# Patient Record
Sex: FEMALE | Race: WHITE | Employment: OTHER | URBAN - METROPOLITAN AREA
[De-identification: names, ages, dates, MRNs, and addresses within clinical notes are randomized per-mention and may not be internally consistent; named-entity substitution may affect disease eponyms.]

---

## 2017-02-28 ENCOUNTER — TELEPHONE (OUTPATIENT)
Dept: FAMILY MEDICINE CLINIC | Facility: CLINIC | Age: 72
End: 2017-02-28

## 2017-02-28 DIAGNOSIS — M85.852 OSTEOPENIA OF THIGH, BILATERAL: Primary | ICD-10-CM

## 2017-02-28 DIAGNOSIS — M85.851 OSTEOPENIA OF THIGH, BILATERAL: Primary | ICD-10-CM

## 2017-04-03 ENCOUNTER — HOSPITAL ENCOUNTER (OUTPATIENT)
Dept: BONE DENSITY | Age: 72
Discharge: HOME | End: 2017-04-03
Attending: FAMILY MEDICINE
Payer: MEDICARE

## 2017-04-03 DIAGNOSIS — M85.852 OSTEOPENIA OF THIGH, BILATERAL: ICD-10-CM

## 2017-04-03 DIAGNOSIS — M85.851 OSTEOPENIA OF THIGH, BILATERAL: ICD-10-CM

## 2017-04-03 PROCEDURE — 77080 DXA BONE DENSITY AXIAL: CPT

## 2017-04-04 ENCOUNTER — TELEPHONE (OUTPATIENT)
Dept: FAMILY MEDICINE CLINIC | Facility: CLINIC | Age: 72
End: 2017-04-04

## 2017-04-11 ENCOUNTER — OFFICE VISIT (OUTPATIENT)
Dept: FAMILY MEDICINE CLINIC | Facility: CLINIC | Age: 72
End: 2017-04-11

## 2017-04-11 VITALS
TEMPERATURE: 98 F | DIASTOLIC BLOOD PRESSURE: 62 MMHG | HEART RATE: 65 BPM | SYSTOLIC BLOOD PRESSURE: 116 MMHG | RESPIRATION RATE: 16 BRPM | HEIGHT: 61 IN | WEIGHT: 134 LBS | BODY MASS INDEX: 25.3 KG/M2 | OXYGEN SATURATION: 99 %

## 2017-04-11 DIAGNOSIS — I10 BENIGN ESSENTIAL HYPERTENSION: ICD-10-CM

## 2017-04-11 DIAGNOSIS — Z00.00 ROUTINE GENERAL MEDICAL EXAMINATION AT A HEALTH CARE FACILITY: Primary | ICD-10-CM

## 2017-04-11 DIAGNOSIS — K63.5 POLYP OF COLON, UNSPECIFIED PART OF COLON, UNSPECIFIED TYPE: ICD-10-CM

## 2017-04-11 PROCEDURE — G0439 PPPS, SUBSEQ VISIT: HCPCS | Performed by: FAMILY MEDICINE

## 2017-04-11 PROCEDURE — 99214 OFFICE O/P EST MOD 30 MIN: CPT | Performed by: FAMILY MEDICINE

## 2017-04-11 NOTE — PATIENT INSTRUCTIONS
Good exam today     Ok to take CoQ10 and omega 3 supplements. Referral to dr. Silvia Oscar re: colonoscopy       Recheck in 1 year.

## 2017-04-12 NOTE — PROGRESS NOTES
[place] [facility]  PROGRESS [facility]  Chief Complaint:   Patient presents with:  Physical      HPI:   This is a 66year old female coming in for general health checkup and follow-up of hypertension hypercholesterolemia.   I reviewed with her her rou vision, double vision . Ears, Nose, Throat:  Denies hearing loss,or disturbance. Denies sore throat  INTEGUMENTARY:  Denies rashes, itching.   CARDIOVASCULAR:   SEE HPI  Denies chest pain, chest pressure, chest discomfort, palpitations, edema, dyspnea on rales/rhonchi/wheezing. ABDOMEN:  Soft, nondistended, nontender, bowel sounds normal in all 4 quadrants, no masses, no hepatosplenomegaly. BACK: No tenderness, no spasm, SLR test negative, FROM.   EXTREMITIES:  No edema, no cyanosis, no clubbing, FROM, MD  4/12/2017  5:31 PM

## 2017-05-08 ENCOUNTER — OFFICE VISIT (OUTPATIENT)
Dept: FAMILY MEDICINE CLINIC | Facility: CLINIC | Age: 72
End: 2017-05-08

## 2017-05-08 VITALS
TEMPERATURE: 98 F | SYSTOLIC BLOOD PRESSURE: 124 MMHG | BODY MASS INDEX: 24.92 KG/M2 | OXYGEN SATURATION: 98 % | HEIGHT: 61 IN | DIASTOLIC BLOOD PRESSURE: 74 MMHG | RESPIRATION RATE: 16 BRPM | WEIGHT: 132 LBS | HEART RATE: 76 BPM

## 2017-05-08 DIAGNOSIS — S46.912A MUSCLE STRAIN, UPPER ARM, LEFT, INITIAL ENCOUNTER: Primary | ICD-10-CM

## 2017-05-08 PROCEDURE — 99213 OFFICE O/P EST LOW 20 MIN: CPT | Performed by: FAMILY MEDICINE

## 2017-05-08 NOTE — PROGRESS NOTES
Chief Complaint:   Patient presents with:  Arm Pain: left arm pain for approx. 4 months that comes and goes      HPI:   This is a 66year old female coming in for arm pain on the left side that is been present for a box 2-3 weeks.   Patient states that this chills, or fatigue,  EENT:  Eyes:  Denies eye pain, visual changes,   Ears, Nose, Throat:  Denies hearing loss,or disturbance. Denies sore throat  INTEGUMENTARY:  Denies rashes, itching.   CARDIOVASCULAR: Denies  chest discomfort, palpitations, edema,  RESP normal     No swelling, no erythema   No edema, no cyanosis,FROM,    NEURO:  No deficit, normal gait, strength and tone, sensory intact,   PSYCH: no depression or anxiety noted. ASSESSMENT AND PLAN:   1.  Muscle strain, upper arm, left, initial encounter

## 2017-05-21 RX ORDER — AMLODIPINE BESYLATE 2.5 MG/1
TABLET ORAL
Qty: 180 TABLET | Refills: 3 | Status: SHIPPED | OUTPATIENT
Start: 2017-05-21 | End: 2018-04-13

## 2017-05-21 RX ORDER — SIMVASTATIN 10 MG
TABLET ORAL
Qty: 90 TABLET | Refills: 3 | Status: SHIPPED | OUTPATIENT
Start: 2017-05-21 | End: 2018-04-13

## 2017-10-27 ENCOUNTER — MED REC SCAN ONLY (OUTPATIENT)
Dept: FAMILY MEDICINE CLINIC | Facility: CLINIC | Age: 72
End: 2017-10-27

## 2017-11-30 ENCOUNTER — TELEPHONE (OUTPATIENT)
Dept: FAMILY MEDICINE CLINIC | Facility: CLINIC | Age: 72
End: 2017-11-30

## 2017-11-30 DIAGNOSIS — Z23 NEED FOR VACCINATION: ICD-10-CM

## 2017-11-30 DIAGNOSIS — Z00.00 ENCOUNTER FOR ANNUAL HEALTH EXAMINATION: ICD-10-CM

## 2017-11-30 NOTE — TELEPHONE ENCOUNTER
----- Message from Lynnette Luna sent at 11/30/2017 12:09 PM CST -----  Regarding: Visit Follow-up Question  Contact: [phone]  Dr. Dimitry Rosado,    When I visited last, you and I discussed whether I should get the pneumonia vaccine and influenza vacc

## 2017-12-04 NOTE — PATIENT INSTRUCTIONS
Denisse Villalobos's SCREENING SCHEDULE   Tests on this list are recommended by your physician but may not be covered, or covered at this frequency, by your insurer. Please check with your insurance carrier before scheduling to verify coverage.    PREVENT results found for this or any previous visit. No flowsheet data found. Fecal Occult Blood   Covered Annually No results found for: FOB, OCCULTSTOOL No flowsheet data found.      Barium Enema-   uncomfortable but covered  Covered but uncomfortable   Glau Hepatitis B for Moderate/High Risk       No orders found for this or any previous visit.  Medium/high risk factors:   End-stage renal disease   Hemophiliacs who received Factor VIII or IX concentrates   Clients of institutions for the mentally retarded   Pe

## 2017-12-04 NOTE — TELEPHONE ENCOUNTER
Only certain forms of flu vaccine and yellow fever vaccines are made with eggs. Prevnar and pneumovax are not made with eggs. It should be safe for patient to receive pneumonia vaccination.      Recommend Prevnar vaccination in the future

## 2017-12-08 ENCOUNTER — NURSE ONLY (OUTPATIENT)
Dept: FAMILY MEDICINE CLINIC | Facility: CLINIC | Age: 72
End: 2017-12-08

## 2017-12-08 DIAGNOSIS — Z23 NEED FOR VACCINATION: ICD-10-CM

## 2017-12-08 PROCEDURE — G0009 ADMIN PNEUMOCOCCAL VACCINE: HCPCS | Performed by: FAMILY MEDICINE

## 2017-12-08 PROCEDURE — 90670 PCV13 VACCINE IM: CPT | Performed by: FAMILY MEDICINE

## 2017-12-08 NOTE — PROGRESS NOTES
Patient given Prevnar 13 vaccination  Order in chart as per Dr. Eryn Jonas given to patient  Patient tolerated injection well    Donnell Martines, 12/08/17, 9:13 AM

## 2018-04-13 ENCOUNTER — OFFICE VISIT (OUTPATIENT)
Dept: FAMILY MEDICINE CLINIC | Facility: CLINIC | Age: 73
End: 2018-04-13

## 2018-04-13 ENCOUNTER — APPOINTMENT (OUTPATIENT)
Dept: LAB | Age: 73
End: 2018-04-13
Attending: FAMILY MEDICINE
Payer: MEDICARE

## 2018-04-13 VITALS
TEMPERATURE: 98 F | DIASTOLIC BLOOD PRESSURE: 62 MMHG | BODY MASS INDEX: 25.61 KG/M2 | HEART RATE: 72 BPM | SYSTOLIC BLOOD PRESSURE: 112 MMHG | WEIGHT: 135.63 LBS | RESPIRATION RATE: 16 BRPM | HEIGHT: 61 IN

## 2018-04-13 DIAGNOSIS — Z00.00 HEALTH CARE MAINTENANCE: Primary | ICD-10-CM

## 2018-04-13 DIAGNOSIS — Z00.00 ENCOUNTER FOR ANNUAL HEALTH EXAMINATION: ICD-10-CM

## 2018-04-13 DIAGNOSIS — M85.851 OSTEOPENIA OF THIGH, BILATERAL: ICD-10-CM

## 2018-04-13 DIAGNOSIS — I10 BENIGN ESSENTIAL HYPERTENSION: ICD-10-CM

## 2018-04-13 DIAGNOSIS — E78.00 PURE HYPERCHOLESTEROLEMIA: ICD-10-CM

## 2018-04-13 DIAGNOSIS — M85.852 OSTEOPENIA OF THIGH, BILATERAL: ICD-10-CM

## 2018-04-13 PROCEDURE — 80053 COMPREHEN METABOLIC PANEL: CPT | Performed by: FAMILY MEDICINE

## 2018-04-13 PROCEDURE — G0439 PPPS, SUBSEQ VISIT: HCPCS | Performed by: FAMILY MEDICINE

## 2018-04-13 PROCEDURE — 36415 COLL VENOUS BLD VENIPUNCTURE: CPT | Performed by: FAMILY MEDICINE

## 2018-04-13 PROCEDURE — 84443 ASSAY THYROID STIM HORMONE: CPT | Performed by: FAMILY MEDICINE

## 2018-04-13 PROCEDURE — 82306 VITAMIN D 25 HYDROXY: CPT | Performed by: FAMILY MEDICINE

## 2018-04-13 PROCEDURE — 80061 LIPID PANEL: CPT | Performed by: FAMILY MEDICINE

## 2018-04-13 PROCEDURE — 85025 COMPLETE CBC W/AUTO DIFF WBC: CPT | Performed by: FAMILY MEDICINE

## 2018-04-13 RX ORDER — AMLODIPINE BESYLATE 2.5 MG/1
2.5 TABLET ORAL 2 TIMES DAILY
Qty: 180 TABLET | Refills: 3 | Status: SHIPPED | OUTPATIENT
Start: 2018-04-13 | End: 2019-05-29

## 2018-04-13 RX ORDER — SIMVASTATIN 10 MG
10 TABLET ORAL
Qty: 90 TABLET | Refills: 3 | Status: SHIPPED | OUTPATIENT
Start: 2018-04-13 | End: 2019-05-29

## 2018-04-13 RX ORDER — CHLORAL HYDRATE 500 MG
1 CAPSULE ORAL DAILY
COMMUNITY

## 2018-04-13 RX ORDER — ERGOCALCIFEROL (VITAMIN D2) 10 MCG
800 TABLET ORAL 2 TIMES DAILY
COMMUNITY
End: 2019-11-22 | Stop reason: ALTCHOICE

## 2018-04-13 NOTE — PROGRESS NOTES
Chief Complaint:   Patient presents with:  Physical: Medicare annual      HPI:   This is a 67year old female coming in for healthcare check. Patient is been overall feeling well. I reviewed her most recent laboratory testing.   Patient is taking her me reaction(s): rash  Latex                       Comment:Other reaction(s): rash  Lidocaine                   Comment:Other reaction(s): shaking in legs  Adhesive Tape           Rash    Comment:RASH/BLISTERS IMMEDIATELY  Beef                        Comment:O 25.62 kg/m² as calculated from the following:    Height as of this encounter: 61\". Weight as of this encounter: 135 lb 9.6 oz. Vital signs reviewed. Appears stated age, well groomed.     Physical Exam:    GEN:  Patient is alert, awake and oriented, wel Sig: Take 1 tablet (25 mg total) by mouth once daily. simvastatin 10 MG Oral Tab 90 tablet 3      Sig: Take 1 tablet (10 mg total) by mouth once daily.       AmLODIPine Besylate 2.5 MG Oral Tab 180 tablet 3      Sig: Take 1 tablet (2.5 mg total) by gloria

## 2018-04-14 ENCOUNTER — TELEPHONE (OUTPATIENT)
Dept: FAMILY MEDICINE CLINIC | Facility: CLINIC | Age: 73
End: 2018-04-14

## 2018-04-14 NOTE — TELEPHONE ENCOUNTER
Patient notified of test results and Dr. Janiya Rockwell. Armand's instructions. Patient states she has never seen Dr. Angeles Mitchell and has not seen a rheumatologist for several years. Asking if she should be followed by rheumatology?  If yes, do you recommend Dr. Lexx Hall

## 2018-04-14 NOTE — TELEPHONE ENCOUNTER
----- Message from Amy Key MD sent at 4/14/2018  7:23 AM CDT -----  Vit D level 30 . Can send results to dr. Deborah Duron. BS stable   Kidney function stable - continue to drink plenty of water. LFTs, CBC , cholesterol profile - good.      Rechec

## 2018-04-15 NOTE — TELEPHONE ENCOUNTER
No , this is my mistake; I thought she was seeing him. I do not think she needs a referral - I do not feel he would change are current plan. Continue with vit D supplement with goal of 2000 - 3000 units per day .

## 2019-01-09 ENCOUNTER — TELEPHONE (OUTPATIENT)
Dept: FAMILY MEDICINE CLINIC | Facility: CLINIC | Age: 74
End: 2019-01-09

## 2019-01-09 NOTE — TELEPHONE ENCOUNTER
Patient is requesting refills of metoprolol, simvastatin, amlodipine, and flurbiprofen. Refills are still available on all those medications through [facility]. Patient states she is now using [facility].      Patient advised to contact [facility]

## 2019-01-09 NOTE — TELEPHONE ENCOUNTER
RF 1)  simvastatin 10mg  #90 2) Amlodipine Besylate 2.5mg  #180 3) Metroprolol 25mg  #90 4) Flurviprofen 100mg #90    to  [place] on [address]             PX on 4/16/19       no call back necessary

## 2019-02-05 ENCOUNTER — TELEPHONE (OUTPATIENT)
Dept: FAMILY MEDICINE CLINIC | Facility: CLINIC | Age: 74
End: 2019-02-05

## 2019-02-05 ENCOUNTER — OFFICE VISIT (OUTPATIENT)
Dept: FAMILY MEDICINE CLINIC | Facility: CLINIC | Age: 74
End: 2019-02-05
Payer: MEDICARE

## 2019-02-05 ENCOUNTER — HOSPITAL ENCOUNTER (OUTPATIENT)
Dept: GENERAL RADIOLOGY | Age: 74
Discharge: HOME | End: 2019-02-05
Attending: FAMILY MEDICINE
Payer: MEDICARE

## 2019-02-05 VITALS
RESPIRATION RATE: 14 BRPM | SYSTOLIC BLOOD PRESSURE: 122 MMHG | HEART RATE: 62 BPM | DIASTOLIC BLOOD PRESSURE: 58 MMHG | WEIGHT: 135 LBS | OXYGEN SATURATION: 97 % | BODY MASS INDEX: 25.49 KG/M2 | HEIGHT: 61 IN | TEMPERATURE: 97 F

## 2019-02-05 DIAGNOSIS — J06.9 UPPER RESPIRATORY TRACT INFECTION, UNSPECIFIED TYPE: Primary | ICD-10-CM

## 2019-02-05 DIAGNOSIS — M79.671 FOOT PAIN, RIGHT: ICD-10-CM

## 2019-02-05 PROCEDURE — 73660 X-RAY EXAM OF TOE(S): CPT | Performed by: FAMILY MEDICINE

## 2019-02-05 PROCEDURE — 99214 OFFICE O/P EST MOD 30 MIN: CPT | Performed by: FAMILY MEDICINE

## 2019-02-05 RX ORDER — NAPROXEN 500 MG/1
500 TABLET ORAL 2 TIMES DAILY WITH MEALS
Qty: 28 TABLET | Refills: 0 | Status: SHIPPED | OUTPATIENT
Start: 2019-02-05 | End: 2019-02-19

## 2019-02-05 RX ORDER — BENZONATATE 200 MG/1
200 CAPSULE ORAL 3 TIMES DAILY PRN
Qty: 30 CAPSULE | Refills: 1 | Status: SHIPPED | OUTPATIENT
Start: 2019-02-05 | End: 2019-03-25

## 2019-02-05 NOTE — TELEPHONE ENCOUNTER
----- Message from Rah Chamberlain MD sent at 2/5/2019  2:47 PM CST -----  Xray report reviewed     No sign of fracture or displacement - moderate arthritis in joint. Recommend take medication - naprosyn 500 mg po bid for 2 weeks.

## 2019-02-05 NOTE — TELEPHONE ENCOUNTER
Patient notified of the below results and recommendations and expressed understanding.      Prescription sent to [facility] per patient request.

## 2019-02-06 NOTE — PROGRESS NOTES
Chief Complaint:   Patient presents with:  Cough: for three weeks, comes and goes      HPI:   This is a 75year old female coming in for acute illness with two problems;   1. Chronic intermittent cough - no fever . Has been feeling well .    No nasal con tablet Rfl: 3   Calcium Carb-Cholecalciferol (CALCIUM 600 + D) 600-200 MG-UNIT Oral Tab Take 1 tablet by mouth 2 (two) times daily. Disp:  Rfl:    naproxen 500 MG Oral Tab Take 1 tablet (500 mg total) by mouth 2 (two) times daily with meals for 14 days.  Maya Aguayo Size: adult)   Pulse 62   Temp 97.1 °F (36.2 °C) (Temporal)   Resp 14   Ht 61\"   Wt 135 lb   SpO2 97%   BMI 25.51 kg/m²  Estimated body mass index is 25.51 kg/m² as calculated from the following:    Height as of this encounter: 61\".     Weight as of this Patient verbalizes understanding. Patient is notified to call with any questions, complications, allergies, or worsening or changing symptoms. Patient is to call with any side effects or complications from the treatments as a result of today.      Problem

## 2019-02-18 ENCOUNTER — TELEPHONE (OUTPATIENT)
Dept: FAMILY MEDICINE CLINIC | Facility: CLINIC | Age: 74
End: 2019-02-18

## 2019-02-18 NOTE — TELEPHONE ENCOUNTER
Patient has been taking naproxen for two weeks for her arthritis in her toe. Patient is wondering if she should continue to take it or stop and see if the pain comes back? Patient states he has helped a lot since she started taking it.  She said she

## 2019-03-25 ENCOUNTER — OFFICE VISIT (OUTPATIENT)
Dept: FAMILY MEDICINE CLINIC | Facility: CLINIC | Age: 74
End: 2019-03-25
Payer: MEDICARE

## 2019-03-25 VITALS
SYSTOLIC BLOOD PRESSURE: 120 MMHG | OXYGEN SATURATION: 97 % | WEIGHT: 134 LBS | DIASTOLIC BLOOD PRESSURE: 68 MMHG | TEMPERATURE: 97 F | HEART RATE: 62 BPM | BODY MASS INDEX: 25 KG/M2

## 2019-03-25 DIAGNOSIS — M79.602 PAIN OF LEFT UPPER EXTREMITY: Primary | ICD-10-CM

## 2019-03-25 PROCEDURE — 99213 OFFICE O/P EST LOW 20 MIN: CPT | Performed by: NURSE PRACTITIONER

## 2019-03-25 NOTE — PROGRESS NOTES
Janette Wilson is a 75year old female. Patient presents with:  Arm Pain      HPI:   Complaints of pain to left arm for the last week- near elbow- no injury   Thought she slept wrong   R handed.    Some pain with flexion of had  Pain is on and off - hu Never Smoker      Smokeless tobacco: Never Used    Alcohol use: Yes      Alcohol/week: 0.0 oz      Comment: occasional- holidays    Drug use: No    History reviewed. No pertinent family history.      Allergies    Hydrocodone-Acetami*        Comment:Other re +5/5 strength to bilateral upper extremities.   Neck–no point tenderness, decreased range of motion–prominently limited hyperextension  NEURO: Sensation grossly intact to fingers bilaterally    ASSESSMENT AND PLAN:     Pain of left upper extremity  (primary

## 2019-04-04 ENCOUNTER — TELEPHONE (OUTPATIENT)
Dept: FAMILY MEDICINE CLINIC | Facility: CLINIC | Age: 74
End: 2019-04-04

## 2019-04-04 ENCOUNTER — HOSPITAL ENCOUNTER (OUTPATIENT)
Dept: GENERAL RADIOLOGY | Age: 74
Discharge: HOME | End: 2019-04-04
Attending: NURSE PRACTITIONER
Payer: MEDICARE

## 2019-04-04 ENCOUNTER — OFFICE VISIT (OUTPATIENT)
Dept: FAMILY MEDICINE CLINIC | Facility: CLINIC | Age: 74
End: 2019-04-04
Payer: MEDICARE

## 2019-04-04 VITALS
TEMPERATURE: 98 F | SYSTOLIC BLOOD PRESSURE: 130 MMHG | WEIGHT: 135 LBS | DIASTOLIC BLOOD PRESSURE: 72 MMHG | HEART RATE: 69 BPM | BODY MASS INDEX: 25.49 KG/M2 | OXYGEN SATURATION: 96 % | HEIGHT: 61 IN

## 2019-04-04 DIAGNOSIS — M79.602 LEFT ARM PAIN: ICD-10-CM

## 2019-04-04 DIAGNOSIS — M25.522 LEFT ELBOW PAIN: ICD-10-CM

## 2019-04-04 DIAGNOSIS — R20.2 TINGLING: Primary | ICD-10-CM

## 2019-04-04 DIAGNOSIS — M79.602 PAIN OF LEFT UPPER EXTREMITY: ICD-10-CM

## 2019-04-04 DIAGNOSIS — M54.2 NECK PAIN: ICD-10-CM

## 2019-04-04 DIAGNOSIS — M54.2 NECK PAIN: Primary | ICD-10-CM

## 2019-04-04 DIAGNOSIS — R20.2 TINGLING: ICD-10-CM

## 2019-04-04 PROCEDURE — 73060 X-RAY EXAM OF HUMERUS: CPT | Performed by: NURSE PRACTITIONER

## 2019-04-04 PROCEDURE — 99214 OFFICE O/P EST MOD 30 MIN: CPT | Performed by: NURSE PRACTITIONER

## 2019-04-04 PROCEDURE — 73090 X-RAY EXAM OF FOREARM: CPT | Performed by: NURSE PRACTITIONER

## 2019-04-04 PROCEDURE — 72050 X-RAY EXAM NECK SPINE 4/5VWS: CPT | Performed by: NURSE PRACTITIONER

## 2019-04-04 RX ORDER — METHYLPREDNISOLONE 4 MG/1
TABLET ORAL
Qty: 1 KIT | Refills: 0 | Status: SHIPPED | OUTPATIENT
Start: 2019-04-04 | End: 2019-05-24 | Stop reason: ALTCHOICE

## 2019-04-04 NOTE — TELEPHONE ENCOUNTER
Informed pt of her xray results. Pt is wondering if she can do PT and what about a wrist brace. Please advise OTW for Friday.

## 2019-04-04 NOTE — TELEPHONE ENCOUNTER
Yes she can get a wrist brace I would recommend a carpal tunnel type brace she can buy that at any pharmacy such as [facility]  She can also do the ice friction rubs to the elbow area.   I entered a referral for physical therapy please fax to [facility]

## 2019-04-04 NOTE — TELEPHONE ENCOUNTER
----- Message from CHARLIE Silva sent at 4/4/2019  2:54 PM CDT -----  Please notify patient that her cervical spine x-ray does show arthritis-continue with same treatment as we discussed.

## 2019-04-04 NOTE — TELEPHONE ENCOUNTER
----- Message from CHARLIE Adler sent at 4/4/2019  2:56 PM CDT -----  Please notify patient that her humerus x-ray shows chronic irritation to her rotator cuff and her shoulder otherwise the lower half of her humerus is normal-10 you with same plan

## 2019-04-04 NOTE — TELEPHONE ENCOUNTER
----- Message from CHARLIE Martinez sent at 4/4/2019  2:56 PM CDT -----  Please notify patient that her forearm x-ray is normal continue with same plan as we discussed

## 2019-04-04 NOTE — PROGRESS NOTES
Janette Wilson is a 75year old female. Patient presents with:  Arm Pain: left      HPI:   Complaints of pain to left arm near elbow- feels it up to her neck - pain was an 8 in the am - now is a 3- tingling. Feels a pull with carying laundry.    Was (CALCIUM 600 + D) 600-200 MG-UNIT Oral Tab Take 1 tablet by mouth 2 (two) times daily.  Disp:  Rfl:       Past Medical History:   Diagnosis Date   • Allergic rhinitis    • Cancer [facility])    • Essential hypertension    • Hyperlipidemia       Social History:  S lesions  HEENT: atraumatic, normocephalic,ears and throat are clear  NECK: supple,no adenopathy, normal thyroid, no nodules  LUNGS: normal rate without respiratory distress, lungs clear to auscultation  CARDIO: RRR without murmur, no edema  GI: normal rosa m

## 2019-04-04 NOTE — PATIENT INSTRUCTIONS
Increase flurbiprofen to three times a day    Start medrol dose pack with food. Follow up at 1pm for x-rays of your neck and left arm.

## 2019-04-05 ENCOUNTER — TELEPHONE (OUTPATIENT)
Dept: FAMILY MEDICINE CLINIC | Facility: CLINIC | Age: 74
End: 2019-04-05

## 2019-04-06 NOTE — TELEPHONE ENCOUNTER
Pt states she went to [facility] and they will need to order a 6\" brace for her wrist.    Pt will wear the brace during the day and at night is comfortable.     Pt will start PT soon and they will instruct pt when to wear the brace,    Pt expressed understandin

## 2019-04-22 ENCOUNTER — TELEPHONE (OUTPATIENT)
Dept: FAMILY MEDICINE CLINIC | Facility: CLINIC | Age: 74
End: 2019-04-22

## 2019-04-22 NOTE — TELEPHONE ENCOUNTER
Patient saw JIMENA Al on 4/4/19 for arm pain. Pam Randall increased her Flurbiprofen to TID as patient had already increased it to BID. Patient is requesting an updated prescription sent to [facility] for 90 day supply. Please advise.

## 2019-04-22 NOTE — TELEPHONE ENCOUNTER
needs refill for Flurbiprofen 100 MG Oral Tab- pt states that as of 3/25 /19 she now takes 3 a day as prescribed by [facility]em[facility] Quiñone[facility]- please send 3 month rx to [facility] on [address] [place].  nataly

## 2019-04-23 NOTE — TELEPHONE ENCOUNTER
Updated prescription sent this morning by Dr. Geri Mckeon. This is a duplicate request.     Medication denied with the above notation.

## 2019-04-23 NOTE — TELEPHONE ENCOUNTER
Updated prescription with refills sent to [facility]. Patient informed and verbalized understanding.      Patient requested we remove [facility] from her preferred pharmacy list.

## 2019-05-08 ENCOUNTER — OFFICE VISIT (OUTPATIENT)
Dept: FAMILY MEDICINE CLINIC | Facility: CLINIC | Age: 74
End: 2019-05-08
Payer: MEDICARE

## 2019-05-08 VITALS
BODY MASS INDEX: 26 KG/M2 | TEMPERATURE: 98 F | RESPIRATION RATE: 14 BRPM | SYSTOLIC BLOOD PRESSURE: 124 MMHG | WEIGHT: 136 LBS | DIASTOLIC BLOOD PRESSURE: 66 MMHG | HEART RATE: 72 BPM

## 2019-05-08 DIAGNOSIS — M54.12 CERVICAL RADICULOPATHY: Primary | ICD-10-CM

## 2019-05-08 DIAGNOSIS — G56.92 NEUROPATHY OF LEFT UPPER EXTREMITY: ICD-10-CM

## 2019-05-08 DIAGNOSIS — M47.22 OSTEOARTHRITIS OF SPINE WITH RADICULOPATHY, CERVICAL REGION: ICD-10-CM

## 2019-05-08 PROCEDURE — 99214 OFFICE O/P EST MOD 30 MIN: CPT | Performed by: FAMILY MEDICINE

## 2019-05-14 NOTE — PROGRESS NOTES
Chief Complaint:   Patient presents with: Other: tingling and pain in left arm into shoulder and neck            HPI:   This is a 75year old female coming in for f/u care. Having difficulty with neck pain with tingling and decrease in strength.      Sym Eosinophil Absolute 0.11 0.00 - 0.30 x10(3) uL    Basophil Absolute 0.07 0.00 - 0.10 x10(3) uL    Immature Granulocyte Absolute 0.01 0.00 - 1.00 x10(3) uL    Neutrophil % 48.4 %    Lymphocyte % 40.9 %    Monocyte % 7.5 %    Eosinophil % 1.8 %    Basophil % tablet (2.5 mg total) by mouth 2 (two) times daily. Disp: 180 tablet Rfl: 3   Calcium Carb-Cholecalciferol (CALCIUM 600 + D) 600-200 MG-UNIT Oral Tab Take 1 tablet by mouth 2 (two) times daily.  Disp:  Rfl:         Allergies:    Hydrocodone-Acetami* °F (36.9 °C) (Temporal)   Resp 14   Wt 136 lb   BMI 25.70 kg/m²  Estimated body mass index is 25.7 kg/m² as calculated from the following:    Height as of 4/4/19: 61\". Weight as of this encounter: 136 lb. Vital signs reviewed. Appears stated age, well Benign essential hypertension     Need for prophylactic vaccination with tetanus toxoid alone     Routine general medical examination at a health care facility     Preop exam for internal medicine      Ramirez Rosario MD  5/13/2019  8:34 PM

## 2019-05-24 ENCOUNTER — OFFICE VISIT (OUTPATIENT)
Dept: SURGERY | Facility: CLINIC | Age: 74
End: 2019-05-24
Payer: MEDICARE

## 2019-05-24 ENCOUNTER — TELEPHONE (OUTPATIENT)
Dept: SURGERY | Facility: CLINIC | Age: 74
End: 2019-05-24

## 2019-05-24 VITALS — SYSTOLIC BLOOD PRESSURE: 122 MMHG | DIASTOLIC BLOOD PRESSURE: 78 MMHG | HEART RATE: 61 BPM

## 2019-05-24 DIAGNOSIS — R20.0 NUMBNESS AND TINGLING IN LEFT ARM: Primary | ICD-10-CM

## 2019-05-24 DIAGNOSIS — R20.2 NUMBNESS AND TINGLING IN LEFT ARM: Primary | ICD-10-CM

## 2019-05-24 DIAGNOSIS — G35 HISTORY OF MULTIPLE SCLEROSIS (HCC): ICD-10-CM

## 2019-05-24 PROCEDURE — 99204 OFFICE O/P NEW MOD 45 MIN: CPT | Performed by: PHYSICIAN ASSISTANT

## 2019-05-24 RX ORDER — GABAPENTIN 100 MG/1
CAPSULE ORAL
Qty: 30 CAPSULE | Refills: 0 | Status: SHIPPED | OUTPATIENT
Start: 2019-05-24 | End: 2019-06-05

## 2019-05-24 NOTE — PROGRESS NOTES
Location of Pain: Began middle March of this year. Tingling down left arm, pain through forearm. Waited a week. Them met with a PA. Has neck Arthritis from years ago.   No issues with bowel or bladder, Did PT April 12 completed 12 sessions, otis LEE, in t

## 2019-05-24 NOTE — TELEPHONE ENCOUNTER
pt can't get emg 7/16/19, having MRI 6/4/19. Made an appt for Dr Modesta Navarro for 6/12/19, should she move it out to after the EMG?

## 2019-05-24 NOTE — H&P
Neurosurgery Clinic Visit  2019    Suzy Ellsworth PCP:  Rosalind Bee MD    1945 MRN [id number]       CC:  Left arm discomfort, tingling    HPI:    Alcon Sow is a very pleasant 75year old female who presents with left arm discomfort an occasional- holidays      Drug use: No    Other Topics      Concerns:    No family history on file. ROS:  A 10-point system was reviewed. Pertinent positives and negatives are noted in HPI.    Physical Exam:   05/24/19  1000   BP: 122/78   Pulse: 61     G impingement or MS plaques. EMG/NCV B/L UE for peripheral neuropathy/entrapment syndromes. Titrate gabapentin as tolerated. F/u with Dr. Deb Lobo after workup. Imaging was reviewed with the patient and explained in detail.   The diagnosis, treatment opt

## 2019-05-28 NOTE — TELEPHONE ENCOUNTER
Spoke to patient to relay information below. Patient is scheduled for her MRI prior to office visit. She also inquired about spacing out Gabapentin. Informed how many hours in between medication should be taken.

## 2019-05-29 ENCOUNTER — OFFICE VISIT (OUTPATIENT)
Dept: FAMILY MEDICINE CLINIC | Facility: CLINIC | Age: 74
End: 2019-05-29
Payer: MEDICARE

## 2019-05-29 ENCOUNTER — LAB ENCOUNTER (OUTPATIENT)
Dept: LAB | Age: 74
End: 2019-05-29
Attending: FAMILY MEDICINE
Payer: MEDICARE

## 2019-05-29 VITALS
TEMPERATURE: 98 F | RESPIRATION RATE: 14 BRPM | BODY MASS INDEX: 25.45 KG/M2 | OXYGEN SATURATION: 98 % | HEART RATE: 78 BPM | WEIGHT: 134.81 LBS | SYSTOLIC BLOOD PRESSURE: 108 MMHG | DIASTOLIC BLOOD PRESSURE: 60 MMHG | HEIGHT: 61 IN

## 2019-05-29 DIAGNOSIS — M85.852 OSTEOPENIA OF THIGH, BILATERAL: ICD-10-CM

## 2019-05-29 DIAGNOSIS — Z00.00 ENCOUNTER FOR ANNUAL HEALTH EXAMINATION: Primary | ICD-10-CM

## 2019-05-29 DIAGNOSIS — I10 BENIGN ESSENTIAL HYPERTENSION: ICD-10-CM

## 2019-05-29 DIAGNOSIS — M85.851 OSTEOPENIA OF THIGH, BILATERAL: ICD-10-CM

## 2019-05-29 DIAGNOSIS — E78.00 PURE HYPERCHOLESTEROLEMIA: ICD-10-CM

## 2019-05-29 DIAGNOSIS — M54.12 CERVICAL RADICULOPATHY: ICD-10-CM

## 2019-05-29 PROCEDURE — 80053 COMPREHEN METABOLIC PANEL: CPT

## 2019-05-29 PROCEDURE — 82306 VITAMIN D 25 HYDROXY: CPT

## 2019-05-29 PROCEDURE — 80061 LIPID PANEL: CPT

## 2019-05-29 PROCEDURE — 85025 COMPLETE CBC W/AUTO DIFF WBC: CPT

## 2019-05-29 PROCEDURE — G0439 PPPS, SUBSEQ VISIT: HCPCS | Performed by: FAMILY MEDICINE

## 2019-05-29 PROCEDURE — 36415 COLL VENOUS BLD VENIPUNCTURE: CPT

## 2019-05-29 PROCEDURE — 99213 OFFICE O/P EST LOW 20 MIN: CPT | Performed by: FAMILY MEDICINE

## 2019-05-29 RX ORDER — SIMVASTATIN 10 MG
10 TABLET ORAL
Qty: 90 TABLET | Refills: 3 | Status: SHIPPED | OUTPATIENT
Start: 2019-05-29 | End: 2020-06-18

## 2019-05-29 RX ORDER — AMLODIPINE BESYLATE 2.5 MG/1
2.5 TABLET ORAL DAILY
Qty: 90 TABLET | Refills: 3 | Status: SHIPPED | OUTPATIENT
Start: 2019-05-29 | End: 2019-12-10 | Stop reason: ALTCHOICE

## 2019-05-29 NOTE — PATIENT INSTRUCTIONS
Good exam       Hold on pneumovax for now. Obtain bone density test in future - wait for now       Obtain labs today       Recheck in 2 - 3 months.

## 2019-05-29 NOTE — PROGRESS NOTES
Chief Complaint:   Patient presents with: Well Adult      HPI:   This is a 75year old female coming in for for healthcare check. Today I reviewed with her multiple issues.   Most recently she has been having difficulty with increase neck pain and radicul <200 mg/dL    Triglycerides 148 <150 mg/dL    HDL Cholesterol 53 >45 mg/dL    LDL Cholesterol 88 <130 mg/dL    VLDL 30 5 - 40 mg/dL    Chol/HDL Ratio 3.23 <4.44    Non HDL Chol 118 <130 mg/dL   TSH W REFLEX TO FREE T4   Result Value Ref Range    TSH 4.260 Yes        Alcohol/week: 0.0 oz        Comment: occasional- holidays      Drug use: No    Other Topics      Concerns:         Current Meds:    Current Outpatient Medications:  simvastatin 10 MG Oral Tab Take 1 tablet (10 mg total) by mouth once daily.  Disp Comment:RASH/BLISTERS IMMEDIATELY             RASH/BLISTERS IMMEDIATELY  Bacitracin              RASH    Comment:Other reaction(s): rash  Eggs Or Egg-Derived*        Comment:Other reaction(s): stomach discomfort       REVIEW OF SYSTEMS:   CONSTITUTIONAL:   icterus, conjunctivae clear bilaterally, no eye discharge   Ears: External normal. Nose: patent, no nasal discharge   Throat:  No tonsillar erythema or exudate.    Mouth:  No oral lesions or ulcerations, good dentition.     NECK: Supple, no CLAD, no JVD, n education done. Outcome: Patient verbalizes understanding. Patient is notified to call with any questions, complications, allergies, or worsening or changing symptoms.   Patient is to call with any side effects or complications from the treatments as a re

## 2019-05-31 ENCOUNTER — TELEPHONE (OUTPATIENT)
Dept: FAMILY MEDICINE CLINIC | Facility: CLINIC | Age: 74
End: 2019-05-31

## 2019-05-31 DIAGNOSIS — E55.9 VITAMIN D DEFICIENCY: Primary | ICD-10-CM

## 2019-05-31 RX ORDER — ERGOCALCIFEROL 1.25 MG/1
50000 CAPSULE ORAL WEEKLY
Qty: 12 CAPSULE | Refills: 0 | Status: SHIPPED | OUTPATIENT
Start: 2019-05-31 | End: 2019-06-12

## 2019-05-31 NOTE — TELEPHONE ENCOUNTER
Patient informed of the below and verbalized understanding. Prescription for vitamin d sent to [facility] and future vitamin d order placed.

## 2019-05-31 NOTE — TELEPHONE ENCOUNTER
----- Message from Shawn Mata MD sent at 5/31/2019  4:14 PM CDT -----  Labs reviewed     Cholesterol profile good.    Improved level on HDL     BS, kidney function , LFTs - normal     Vit D level is lower than last check - recommend Vit D prescription

## 2019-06-01 ENCOUNTER — TELEPHONE (OUTPATIENT)
Dept: FAMILY MEDICINE CLINIC | Facility: CLINIC | Age: 74
End: 2019-06-01

## 2019-06-03 NOTE — TELEPHONE ENCOUNTER
Patient instructed to take the high dose vitamin d prescription as well as her calcium tablets. Patient verbalized understanding.

## 2019-06-04 ENCOUNTER — HOSPITAL ENCOUNTER (OUTPATIENT)
Dept: MRI IMAGING | Facility: HOSPITAL | Age: 74
Discharge: HOME | End: 2019-06-04
Attending: PHYSICIAN ASSISTANT
Payer: MEDICARE

## 2019-06-04 ENCOUNTER — TELEPHONE (OUTPATIENT)
Dept: SURGERY | Facility: CLINIC | Age: 74
End: 2019-06-04

## 2019-06-04 DIAGNOSIS — G35 HISTORY OF MULTIPLE SCLEROSIS (HCC): ICD-10-CM

## 2019-06-04 DIAGNOSIS — R20.2 NUMBNESS AND TINGLING IN LEFT ARM: ICD-10-CM

## 2019-06-04 DIAGNOSIS — R20.0 NUMBNESS AND TINGLING IN LEFT ARM: ICD-10-CM

## 2019-06-04 PROCEDURE — 70551 MRI BRAIN STEM W/O DYE: CPT | Performed by: PHYSICIAN ASSISTANT

## 2019-06-04 PROCEDURE — 72141 MRI NECK SPINE W/O DYE: CPT | Performed by: PHYSICIAN ASSISTANT

## 2019-06-04 NOTE — TELEPHONE ENCOUNTER
Spoke to Garrett mayberry regarding MRI ordered w/o contrast.  It is recommended according to Garrett mayberry to have MRI with contrast due to [atient's history of MS.   After looking through [facility] note, there is no mention of contrast.  Yariel Chase to leave order as i

## 2019-06-05 ENCOUNTER — TELEPHONE (OUTPATIENT)
Dept: SURGERY | Facility: CLINIC | Age: 74
End: 2019-06-05

## 2019-06-05 RX ORDER — GABAPENTIN 100 MG/1
100 CAPSULE ORAL 2 TIMES DAILY
Qty: 60 CAPSULE | Refills: 0 | Status: SHIPPED | OUTPATIENT
Start: 2019-06-05 | End: 2019-07-03

## 2019-06-05 NOTE — TELEPHONE ENCOUNTER
OK for BID. I looked at her MRIs, she does have some pressure on the left C7 nerve in her neck which may be contributing to her arm numbness. We can talk more at her next visit.

## 2019-06-05 NOTE — TELEPHONE ENCOUNTER
Patient was just started on Gabapentin 100 mg and just started the increase to TID on Monday. Patient states yesterday she was so tired that she went to bed way earlier than she normally does.      Patient stated the medication did help and is no longer hav

## 2019-06-05 NOTE — TELEPHONE ENCOUNTER
Pt states she's currently taking Gabapentin 3x's daily since Monday.  Pt states Rx is making her very drowsy to where she is sleeping 12hr days pt states she is not use to sleeping that ling through out the date and that she would like to know what other me

## 2019-06-12 ENCOUNTER — OFFICE VISIT (OUTPATIENT)
Dept: SURGERY | Facility: CLINIC | Age: 74
End: 2019-06-12
Payer: MEDICARE

## 2019-06-12 VITALS — HEART RATE: 76 BPM | DIASTOLIC BLOOD PRESSURE: 68 MMHG | SYSTOLIC BLOOD PRESSURE: 132 MMHG

## 2019-06-12 DIAGNOSIS — R20.0 NUMBNESS AND TINGLING IN LEFT ARM: Primary | ICD-10-CM

## 2019-06-12 DIAGNOSIS — R20.2 NUMBNESS AND TINGLING IN LEFT ARM: Primary | ICD-10-CM

## 2019-06-12 PROCEDURE — 99213 OFFICE O/P EST LOW 20 MIN: CPT | Performed by: NEUROLOGICAL SURGERY

## 2019-06-12 NOTE — PROGRESS NOTES
Pt here to review MRI of the spine tiana brain. Pt c/o tingling on the left arm and back of the neck, pt states pain in the arm is on and off.

## 2019-06-12 NOTE — PROGRESS NOTES
[facility]  Neurosurgery Clinic Visit      HISTORY OF PRESENT ILLNESS:  Anahy Mena is a(n) 75year old female returns after MRI to discuss left arm tingling.   She noted a significant increase in somnolence when increasing gabape HEADACHE/N/V  Influenza Vaccines        Penicillin G              Adhesive Tape           RASH    Comment:RASH/BLISTERS IMMEDIATELY             RASH/BLISTERS IMMEDIATELY  Bacitracin              RASH    Comment:Other reaction(s): rash  Eggs Or Egg-D

## 2019-06-26 ENCOUNTER — TELEPHONE (OUTPATIENT)
Dept: SURGERY | Facility: CLINIC | Age: 74
End: 2019-06-26

## 2019-06-26 NOTE — TELEPHONE ENCOUNTER
Patient stated she is experiencng new symptoms since last office visit and wishes to be seen sooner.   Patient stated she is having tingling down left arm, new neck pain that has moved to opposite side, Monday leg began bothering her and making walking diff

## 2019-06-28 ENCOUNTER — HOSPITAL ENCOUNTER (OUTPATIENT)
Dept: GENERAL RADIOLOGY | Facility: HOSPITAL | Age: 74
Discharge: HOME | End: 2019-06-28
Attending: PHYSICIAN ASSISTANT
Payer: MEDICARE

## 2019-06-28 ENCOUNTER — OFFICE VISIT (OUTPATIENT)
Dept: PAIN CLINIC | Facility: CLINIC | Age: 74
End: 2019-06-28
Payer: MEDICARE

## 2019-06-28 ENCOUNTER — OFFICE VISIT (OUTPATIENT)
Dept: SURGERY | Facility: CLINIC | Age: 74
End: 2019-06-28
Payer: MEDICARE

## 2019-06-28 VITALS
BODY MASS INDEX: 25.11 KG/M2 | SYSTOLIC BLOOD PRESSURE: 118 MMHG | HEIGHT: 61 IN | WEIGHT: 133 LBS | DIASTOLIC BLOOD PRESSURE: 50 MMHG | OXYGEN SATURATION: 98 % | HEART RATE: 76 BPM

## 2019-06-28 VITALS — SYSTOLIC BLOOD PRESSURE: 120 MMHG | HEART RATE: 74 BPM | DIASTOLIC BLOOD PRESSURE: 68 MMHG

## 2019-06-28 DIAGNOSIS — M54.2 CERVICALGIA: ICD-10-CM

## 2019-06-28 DIAGNOSIS — M54.12 CERVICAL RADICULOPATHY: Primary | ICD-10-CM

## 2019-06-28 DIAGNOSIS — M25.551 RIGHT HIP PAIN: ICD-10-CM

## 2019-06-28 DIAGNOSIS — R20.2 NUMBNESS AND TINGLING IN LEFT ARM: Primary | ICD-10-CM

## 2019-06-28 DIAGNOSIS — R20.0 NUMBNESS AND TINGLING IN LEFT ARM: Primary | ICD-10-CM

## 2019-06-28 PROCEDURE — 99213 OFFICE O/P EST LOW 20 MIN: CPT | Performed by: PHYSICIAN ASSISTANT

## 2019-06-28 PROCEDURE — 73502 X-RAY EXAM HIP UNI 2-3 VIEWS: CPT | Performed by: PHYSICIAN ASSISTANT

## 2019-06-28 PROCEDURE — 99203 OFFICE O/P NEW LOW 30 MIN: CPT | Performed by: ANESTHESIOLOGY

## 2019-06-28 RX ORDER — METHYLPREDNISOLONE 4 MG/1
4 TABLET ORAL WEEKLY
COMMUNITY
Start: 2019-06-27 | End: 2019-07-24 | Stop reason: ALTCHOICE

## 2019-06-28 RX ORDER — CYCLOBENZAPRINE HCL 10 MG
10 TABLET ORAL AS NEEDED
COMMUNITY
Start: 2019-06-27 | End: 2019-07-24 | Stop reason: ALTCHOICE

## 2019-06-28 NOTE — PROGRESS NOTES
Pt is here for follow up. Pt states that she had new pain in the left side of the cervical. Pt states that she has had pain in the right side of the groin into the right thigh. Pt states that she has issues with numbness and tingling in the left arm.  Pt st

## 2019-06-28 NOTE — PROGRESS NOTES
PHYSICAL EXAM:   Physical Exam   Constitutional:           Pain location: neck left side      Pain level: 4/10    Imaging: MRI 6/4/19             Work Related:   No        Receiving Work Comp/Disability:   No    Numeric Rating Scale:  Pain at Present:  4

## 2019-06-28 NOTE — PROGRESS NOTES
Spoke with patient and scheduled injections. Reviewed pre-op instructions. Patient verbalized understanding, no further questions at this time. Pre-op instructions handout given to patient. Confirmed procedures to be done with Dr. Jimenez Wakefield.  Patient requeste

## 2019-06-28 NOTE — PROGRESS NOTES
Neurosurgery Clinic Visit  2019    Adal Palumbo PCP:  Emily Williamson MD    1945 MRN [id number]       CC:  Cervicalgia, Right Hip Pain    HPI:    Griselda is here for f/u after recent ER visit.   Over the last week she has had progressi She has never used smokeless tobacco. She reports that she drinks alcohol.  She reports that she does not use drugs.     ALLERGIES:     Anesthesia S-I-40  *    OTHER (SEE COMMENTS), Tightness in                            Chest  Hydrocodone-Acetami*    Armando Chain deferred     Right groin pain with int/ext rotation of the right hip     DIAGNOSTIC DATA:       IMAGING:  MRI brain with periventricular white matter hyperintensity  MRI cervical with C5-6, 6-7 disc bulges with right C5-6 and left C6-7 foraminal stenosis

## 2019-06-28 NOTE — PATIENT INSTRUCTIONS
Refill policies:    • Allow 2-3 business days for refills; controlled substances may take longer.   • Contact your pharmacy at least 5 days prior to running out of medication and have them send an electronic request or submit request through the “request re Depending on your insurance carrier, approval may take 3-10 days. It is highly recommended patients contact their insurance carrier directly to determine coverage.   If test is done without insurance authorization, patient may be responsible for the entire 7/8/19  Check-In/Arrival Time: 12:00 pm      ** TO AVOID CANCELLATION AND/OR RESCHEDULING: PLEASE CALL [facility] PRE-PROCEDURE LINE -[phone] FOR DETAILED INSTRUCTIONS FIVE TO SEVEN DAYS PRIOR TO PROCEDURE**        Prior to the procedure:  Please update us - 10 days  • Others 7 days  NSAIDs: 24 hours  Meloxicam -- Cervical procedures require 3 days  • Ibuprofen (Motrin, Advil, Vicoprofen), Naproxen (Naprosyn, Aleve), Piroxcam (Feldene), Meloxicam (Mobic)--(Cervical procedures will require 3 days), Oxaprozin injection is an injection of a steroid, or anti-inflammatory medication, into the epidural space in your spine. The spinal cord is protected by bony structure called the spinal column and is covered by the dural sac.  This sac contains spinal fluid that Patients not receiving sedation are monitored as needed. The skin of the neck or back is cleaned with antiseptic solution and numbed with local anesthetic. Then the injection needle is placed under X-ray guidance.  Finally, the needle is removed and an adhe pain generators in the spine, the degree of response will vary widely. What are the risks and side effects? Epidural injections are relatively safe. However, with any procedure there are risks, side effects and the possibility of complications.  The most

## 2019-06-28 NOTE — H&P
Name: Montse Hurtado   : 1945   DOS: 2019     Chief complaint: neck pain    History of present illness:  Montse Hurtado is a 75year old female referred by neurosurgery for evaluation of long-standing history of neck pain.   He began to Omega-3 1000 MG Oral Cap Take 1 tablet by mouth daily. Disp:  Rfl:    Calcium Carb-Cholecalciferol (CALCIUM 600 + D) 600-200 MG-UNIT Oral Tab Take 1 tablet by mouth 2 (two) times daily.  Disp:  Rfl:      Past Surgical History:   Procedure Laterality Date radiculopathy  (primary encounter diagnosis). Plan:     The patient is a 19-year-old female with a history of cervical radiculopathy and cervical facet mediated pain. I had extensive discussion with her regarding the treatment options for this.   She

## 2019-07-01 ENCOUNTER — TELEPHONE (OUTPATIENT)
Dept: SURGERY | Facility: CLINIC | Age: 74
End: 2019-07-01

## 2019-07-01 NOTE — TELEPHONE ENCOUNTER
XR of the hip reviewed. She has some arthritis as expected but it is not described as severe. We can continue to monitor how this pain responds to the meds.

## 2019-07-01 NOTE — TELEPHONE ENCOUNTER
Pt would like to speak with nurse re: upcoming injection, has questions.  Was in ER in [facility] 6/26 and was put on medicines there

## 2019-07-01 NOTE — TELEPHONE ENCOUNTER
Spoke w/ pt regarding upcoming injection. Pt inquiring about medications to hold with recent visit to ER. Pt also inquiring about NS and follow-up. Answered pt questions regarding medications and reviewed pre-procedure instructions.   Pt verbalized underst

## 2019-07-02 ENCOUNTER — TELEPHONE (OUTPATIENT)
Dept: SURGERY | Facility: CLINIC | Age: 74
End: 2019-07-02

## 2019-07-02 NOTE — TELEPHONE ENCOUNTER
Spoke to patient, confirmed procedure date of 7/8/19 with Dr Matias Primrose and to be checked in at outpatient registration at 12:00 pm. Patient called pre-procedure line and understood instructions.  Patient instructed to call office if there are additional question

## 2019-07-02 NOTE — TELEPHONE ENCOUNTER
Pt seen in ED was prescribed Cyclobenzaprine by ED Dr.Pt wants to know will Neurosurg take over Rx refill or will have to come from pain management.  Please contact pt

## 2019-07-03 RX ORDER — GABAPENTIN 100 MG/1
100 CAPSULE ORAL 2 TIMES DAILY
Qty: 60 CAPSULE | Refills: 0 | Status: SHIPPED | OUTPATIENT
Start: 2019-07-03 | End: 2019-07-24

## 2019-07-03 RX ORDER — CYCLOBENZAPRINE HCL 10 MG
10 TABLET ORAL 3 TIMES DAILY PRN
Qty: 60 TABLET | Refills: 0 | Status: SHIPPED | OUTPATIENT
Start: 2019-07-03 | End: 2019-07-24

## 2019-07-03 NOTE — TELEPHONE ENCOUNTER
Spoke with patient and informed her the cyclobenzaprine rx was refilled. Patient understood and stated she will also need a refill of her Gabapentin.  Patient also stated as of yesterday she is now getting shooting pain from her right groin down her leg int

## 2019-07-03 NOTE — TELEPHONE ENCOUNTER
If the pain is now going all the way down her leg, she likely has a nerve irritated in her back now. We can discuss this at her next visit. We will still be treating her neck first unless her back and leg start bothering her much more than the neck.

## 2019-07-03 NOTE — TELEPHONE ENCOUNTER
Called patient back and relayed below information. Also, patient stated she has dry mouth from all the medication she is taking and asked if there is anything she can purchase from the drug store.   I advised her she can purchase Biotene mouth wash, drink

## 2019-07-08 ENCOUNTER — APPOINTMENT (OUTPATIENT)
Dept: GENERAL RADIOLOGY | Facility: HOSPITAL | Age: 74
End: 2019-07-08
Attending: ANESTHESIOLOGY
Payer: MEDICARE

## 2019-07-08 ENCOUNTER — HOSPITAL ENCOUNTER (OUTPATIENT)
Facility: HOSPITAL | Age: 74
Setting detail: HOSPITAL OUTPATIENT SURGERY
Discharge: HOME | End: 2019-07-08
Attending: ANESTHESIOLOGY | Admitting: ANESTHESIOLOGY
Payer: MEDICARE

## 2019-07-08 VITALS
RESPIRATION RATE: 18 BRPM | DIASTOLIC BLOOD PRESSURE: 67 MMHG | TEMPERATURE: 98 F | SYSTOLIC BLOOD PRESSURE: 128 MMHG | OXYGEN SATURATION: 100 % | HEART RATE: 76 BPM

## 2019-07-08 DIAGNOSIS — M54.12 CERVICAL RADICULOPATHY: ICD-10-CM

## 2019-07-08 PROCEDURE — 3E0R33Z INTRODUCTION OF ANTI-INFLAMMATORY INTO SPINAL CANAL, PERCUTANEOUS APPROACH: ICD-10-PCS | Performed by: ANESTHESIOLOGY

## 2019-07-08 PROCEDURE — 3E0R37Z INTRODUCTION OF ELECTROLYTIC AND WATER BALANCE SUBSTANCE INTO SPINAL CANAL, PERCUTANEOUS APPROACH: ICD-10-PCS | Performed by: ANESTHESIOLOGY

## 2019-07-08 PROCEDURE — B01B1ZZ FLUOROSCOPY OF SPINAL CORD USING LOW OSMOLAR CONTRAST: ICD-10-PCS | Performed by: ANESTHESIOLOGY

## 2019-07-08 RX ORDER — 0.9 % SODIUM CHLORIDE 0.9 %
VIAL (ML) INJECTION AS NEEDED
Status: DISCONTINUED | OUTPATIENT
Start: 2019-07-08 | End: 2019-07-08

## 2019-07-08 RX ORDER — DEXAMETHASONE SODIUM PHOSPHATE 10 MG/ML
INJECTION, SOLUTION INTRAMUSCULAR; INTRAVENOUS AS NEEDED
Status: DISCONTINUED | OUTPATIENT
Start: 2019-07-08 | End: 2019-07-08

## 2019-07-08 RX ORDER — DIPHENHYDRAMINE HYDROCHLORIDE 50 MG/ML
50 INJECTION INTRAMUSCULAR; INTRAVENOUS ONCE AS NEEDED
Status: DISCONTINUED | OUTPATIENT
Start: 2019-07-08 | End: 2019-07-08

## 2019-07-08 RX ORDER — LIDOCAINE HYDROCHLORIDE 10 MG/ML
INJECTION, SOLUTION EPIDURAL; INFILTRATION; INTRACAUDAL; PERINEURAL AS NEEDED
Status: DISCONTINUED | OUTPATIENT
Start: 2019-07-08 | End: 2019-07-08

## 2019-07-08 RX ORDER — ONDANSETRON 2 MG/ML
4 INJECTION INTRAMUSCULAR; INTRAVENOUS ONCE AS NEEDED
Status: DISCONTINUED | OUTPATIENT
Start: 2019-07-08 | End: 2019-07-08

## 2019-07-08 RX ORDER — SODIUM CHLORIDE, SODIUM LACTATE, POTASSIUM CHLORIDE, CALCIUM CHLORIDE 600; 310; 30; 20 MG/100ML; MG/100ML; MG/100ML; MG/100ML
100 INJECTION, SOLUTION INTRAVENOUS CONTINUOUS
Status: DISCONTINUED | OUTPATIENT
Start: 2019-07-08 | End: 2019-07-08

## 2019-07-08 NOTE — H&P
History & Physical Examination    Patient Name: Vernal Delay  MRN: [id number]  CSN: [id number]  YOB: 1945    Pre-Operative Diagnosis:  Cervical radiculopathy [M54.12]    Present Illness: Patient with cervical radiculopathy for epid reaction(s): blurred vision, dizzy,             vomiting, headache  Bacitracin-Polymyxi*        Comment:Other reaction(s): rash  Latex                       Comment:Other reaction(s): rash  Lidocaine                   Comment:Other reaction(s): shaking in

## 2019-07-08 NOTE — OPERATIVE REPORT
[facility]  Operative Report  2019     [facility] Patient Status:  Hospital Outpatient Surgery    1945 MRN [id number]   [facility] ENDOSCOPY Attending Ron Leblanc MD   Hosp Day # 0 PCP Conchis Ling MD     In and recovered and was discharged to a responsible adult after discharge criteria were met. Complications: None. Follow up: The patient was followed in the pain clinic as needed basis.           Nohemi Salinas MD

## 2019-07-16 ENCOUNTER — OFFICE VISIT (OUTPATIENT)
Dept: ELECTROPHYSIOLOGY | Facility: HOSPITAL | Age: 74
End: 2019-07-16
Attending: PHYSICIAN ASSISTANT
Payer: MEDICARE

## 2019-07-16 DIAGNOSIS — R20.0 NUMBNESS AND TINGLING IN LEFT ARM: ICD-10-CM

## 2019-07-16 DIAGNOSIS — R20.2 NUMBNESS AND TINGLING IN LEFT ARM: ICD-10-CM

## 2019-07-16 PROCEDURE — 95886 MUSC TEST DONE W/N TEST COMP: CPT | Performed by: OTHER

## 2019-07-16 PROCEDURE — 95911 NRV CNDJ TEST 9-10 STUDIES: CPT | Performed by: OTHER

## 2019-07-16 NOTE — PROCEDURES
[facility]  Nerve Conduction & EMG Report                      Yuliet Yash, [address]   EMG department   [address] [place] [address]  [facility], [address] [place] [address]  [phone]          Patient name: Ninoska Osorio  Kadi

## 2019-07-24 ENCOUNTER — OFFICE VISIT (OUTPATIENT)
Dept: SURGERY | Facility: CLINIC | Age: 74
End: 2019-07-24
Payer: MEDICARE

## 2019-07-24 VITALS — DIASTOLIC BLOOD PRESSURE: 72 MMHG | HEART RATE: 74 BPM | SYSTOLIC BLOOD PRESSURE: 122 MMHG

## 2019-07-24 DIAGNOSIS — G35 H/O MULTIPLE SCLEROSIS (HCC): Primary | ICD-10-CM

## 2019-07-24 PROCEDURE — 99213 OFFICE O/P EST LOW 20 MIN: CPT | Performed by: NEUROLOGICAL SURGERY

## 2019-07-24 RX ORDER — GABAPENTIN 100 MG/1
100 CAPSULE ORAL 2 TIMES DAILY
Qty: 180 CAPSULE | Refills: 0 | Status: SHIPPED | OUTPATIENT
Start: 2019-07-24 | End: 2019-09-12

## 2019-07-24 RX ORDER — CYCLOBENZAPRINE HCL 10 MG
10 TABLET ORAL 3 TIMES DAILY PRN
Qty: 270 TABLET | Refills: 0 | Status: SHIPPED | OUTPATIENT
Start: 2019-07-24 | End: 2019-12-10 | Stop reason: ALTCHOICE

## 2019-07-24 NOTE — PROGRESS NOTES
[facility]  Neurosurgery Clinic Visit      HISTORY OF PRESENT ILLNESS:  Scott Loco is a(n) 75year old female here for follow-up of neck and left arm pain, n/t. She notes improved neck pain, ROM s/p MARVIN with Dr. Mary Cisneros.  She Adhesive Tape           RASH    Comment:RASH/BLISTERS IMMEDIATELY             RASH/BLISTERS IMMEDIATELY  Bacitracin              RASH    Comment:Other reaction(s): rash  Eggs Or Egg-Derived*        Comment:Other reaction(s): stomach discomfort    MEDICAT

## 2019-07-25 ENCOUNTER — OFFICE VISIT (OUTPATIENT)
Dept: PAIN CLINIC | Facility: CLINIC | Age: 74
End: 2019-07-25
Payer: MEDICARE

## 2019-07-25 VITALS
HEART RATE: 72 BPM | BODY MASS INDEX: 25.3 KG/M2 | OXYGEN SATURATION: 97 % | WEIGHT: 134 LBS | HEIGHT: 61 IN | DIASTOLIC BLOOD PRESSURE: 68 MMHG | SYSTOLIC BLOOD PRESSURE: 112 MMHG

## 2019-07-25 DIAGNOSIS — R20.0 NUMBNESS AND TINGLING IN LEFT ARM: ICD-10-CM

## 2019-07-25 DIAGNOSIS — M54.12 CERVICAL RADICULOPATHY: Primary | ICD-10-CM

## 2019-07-25 DIAGNOSIS — R20.2 NUMBNESS AND TINGLING IN LEFT ARM: ICD-10-CM

## 2019-07-25 PROCEDURE — 99213 OFFICE O/P EST LOW 20 MIN: CPT | Performed by: ANESTHESIOLOGY

## 2019-07-25 NOTE — PROGRESS NOTES
Name: Sharmaine Pelletier   : 1945   DOS: 2019     Pain Clinic Follow Up Visit:   Patient presents with:   Follow - Up      Sharmaine Pelletier is a 75year old female with a history of cervical radiculopathy here for follow-up after times daily as needed for Muscle spasms. Disp: 270 tablet Rfl: 0   simvastatin 10 MG Oral Tab Take 1 tablet (10 mg total) by mouth once daily. Disp: 90 tablet Rfl: 3   amLODIPine Besylate 2.5 MG Oral Tab Take 1 tablet (2.5 mg total) by mouth daily.  Disp: 9 low.  She can stop those at any time should she choose. She will decide whether to pursue ulnar nerve decompression after she returns from vacation.     Arnulfo Johnson MD, 7/25/2019, 9:43 AM

## 2019-07-25 NOTE — PROGRESS NOTES
Patient presents in office today with reported pain in neck, left arm at elbow    Current pain level reported = 1-2/10    F/u post CERVICAL EPIDURAL STEROID INJECTION  (7/8/19), relief reported = patient unable to provide percentage of relief.  States that

## 2019-08-13 ENCOUNTER — LAB ENCOUNTER (OUTPATIENT)
Dept: LAB | Age: 74
End: 2019-08-13
Attending: FAMILY MEDICINE
Payer: MEDICARE

## 2019-08-13 ENCOUNTER — OFFICE VISIT (OUTPATIENT)
Dept: FAMILY MEDICINE CLINIC | Facility: CLINIC | Age: 74
End: 2019-08-13
Payer: MEDICARE

## 2019-08-13 VITALS
OXYGEN SATURATION: 97 % | BODY MASS INDEX: 26.02 KG/M2 | DIASTOLIC BLOOD PRESSURE: 68 MMHG | TEMPERATURE: 98 F | SYSTOLIC BLOOD PRESSURE: 118 MMHG | HEIGHT: 61 IN | HEART RATE: 90 BPM | WEIGHT: 137.81 LBS | RESPIRATION RATE: 16 BRPM

## 2019-08-13 DIAGNOSIS — R30.0 DYSURIA: ICD-10-CM

## 2019-08-13 DIAGNOSIS — M54.12 CERVICAL RADICULOPATHY: ICD-10-CM

## 2019-08-13 DIAGNOSIS — E55.9 VITAMIN D DEFICIENCY: Primary | ICD-10-CM

## 2019-08-13 LAB
BILIRUB UR QL STRIP.AUTO: NEGATIVE
CLARITY UR REFRACT.AUTO: CLEAR
COLOR UR AUTO: YELLOW
GLUCOSE UR STRIP.AUTO-MCNC: NEGATIVE MG/DL
KETONES UR STRIP.AUTO-MCNC: NEGATIVE MG/DL
NITRITE UR QL STRIP.AUTO: NEGATIVE
PH UR STRIP.AUTO: 6 [PH] (ref 4.5–8)
PROT UR STRIP.AUTO-MCNC: NEGATIVE MG/DL
SP GR UR STRIP.AUTO: 1.01 (ref 1–1.03)
UROBILINOGEN UR STRIP.AUTO-MCNC: <2 MG/DL
VIT D+METAB SERPL-MCNC: 58.6 NG/ML (ref 30–100)

## 2019-08-13 PROCEDURE — 87086 URINE CULTURE/COLONY COUNT: CPT

## 2019-08-13 PROCEDURE — 36415 COLL VENOUS BLD VENIPUNCTURE: CPT | Performed by: FAMILY MEDICINE

## 2019-08-13 PROCEDURE — 82306 VITAMIN D 25 HYDROXY: CPT | Performed by: FAMILY MEDICINE

## 2019-08-13 PROCEDURE — 81001 URINALYSIS AUTO W/SCOPE: CPT

## 2019-08-13 PROCEDURE — 99214 OFFICE O/P EST MOD 30 MIN: CPT | Performed by: FAMILY MEDICINE

## 2019-08-13 NOTE — PATIENT INSTRUCTIONS
Decrease cyclobenzaprine to once at night in one week. Check vit D level today . cortizone 10 cream to rash on right hand.

## 2019-08-14 ENCOUNTER — TELEPHONE (OUTPATIENT)
Dept: FAMILY MEDICINE CLINIC | Facility: CLINIC | Age: 74
End: 2019-08-14

## 2019-08-14 RX ORDER — ERGOCALCIFEROL 1.25 MG/1
50000 CAPSULE ORAL WEEKLY
Qty: 12 CAPSULE | Refills: 1 | Status: SHIPPED | OUTPATIENT
Start: 2019-08-14 | End: 2019-10-31

## 2019-08-14 NOTE — TELEPHONE ENCOUNTER
Patient informed of the below results and recommendations. Script pended for Vitamin D to [facility] Please advise.

## 2019-08-14 NOTE — TELEPHONE ENCOUNTER
----- Message from Jo Doherty MD sent at 8/14/2019  8:06 AM CDT -----  Labs reviewed     Vit D level improved - with patient's current symptoms,  Goal is 60 - 90  Range.    Recommend continue with Vit D prescription for 3 additional months and recheck

## 2019-08-14 NOTE — PROGRESS NOTES
Chief Complaint:   Patient presents with: Follow - Up: Vitamin D level  Medication Follow-Up  Rash: On left arm and fingers      HPI:   This is a 75year old female coming in for f/u care. Issues of left arm numbness.      Rash on right hand present ro MG Oral Tab Take 1 tablet (25 mg total) by mouth once daily. Disp: 90 tablet Rfl: 3   Co-Enzyme Q-10 30 MG Oral Cap Take 30 mg by mouth daily. Disp:  Rfl:    Ergocalciferol (VITAMIN D2) 400 units Oral Tab Take 800 mcg by mouth 2 (two) times daily.  Disp:  R breath, wheezing, cough or sputum production. GASTROINTESTINAL:  Denies abdominal pain, nausea, vomiting, constipation, diarrhea  : denies dysuria, no urinary frequency , no discharge.   MUSCULOSKELETAL:  Denies weakness, muscle aches,     NEUROLOGICAL: D, 25-HYDROXY    2. Cervical radiculopathy      3.  Dysuria    - URINALYSIS WITH CULTURE REFLEX; Future        Meds & Refills for this Visit:  Requested Prescriptions      No prescriptions requested or ordered in this encounter       Patient Instructions

## 2019-08-16 ENCOUNTER — TELEPHONE (OUTPATIENT)
Dept: FAMILY MEDICINE CLINIC | Facility: CLINIC | Age: 74
End: 2019-08-16

## 2019-08-16 NOTE — TELEPHONE ENCOUNTER
----- Message from Amandepe Montez MD sent at 8/15/2019  3:24 PM CDT -----  Urine culture negative. No further treatment at this time.

## 2019-09-11 ENCOUNTER — OFFICE VISIT (OUTPATIENT)
Dept: FAMILY MEDICINE CLINIC | Facility: CLINIC | Age: 74
End: 2019-09-11
Payer: MEDICARE

## 2019-09-11 VITALS
OXYGEN SATURATION: 98 % | DIASTOLIC BLOOD PRESSURE: 68 MMHG | WEIGHT: 137 LBS | BODY MASS INDEX: 25.86 KG/M2 | HEART RATE: 90 BPM | HEIGHT: 61 IN | RESPIRATION RATE: 16 BRPM | SYSTOLIC BLOOD PRESSURE: 120 MMHG | TEMPERATURE: 97 F

## 2019-09-11 DIAGNOSIS — E55.9 VITAMIN D DEFICIENCY: Primary | ICD-10-CM

## 2019-09-11 DIAGNOSIS — M54.12 CERVICAL RADICULOPATHY: ICD-10-CM

## 2019-09-11 PROCEDURE — 99214 OFFICE O/P EST MOD 30 MIN: CPT | Performed by: FAMILY MEDICINE

## 2019-09-11 NOTE — PROGRESS NOTES
Chief Complaint:   Patient presents with:  Medication Follow-Up      HPI:   This is a 75year old female coming in for follow-up care. Patient with a history for cervical radiculopathy. Patient's seen pain clinic and Dr. Amada Guerra.   Patient underwent stero 1 capsule (100 mg total) by mouth 2 (two) times daily. Disp: 180 capsule Rfl: 0   Cyclobenzaprine HCl 10 MG Oral Tab Take 1 tablet (10 mg total) by mouth 3 (three) times daily as needed for Muscle spasms.  Disp: 270 tablet Rfl: 0   simvastatin 10 MG Oral Ta Comment:Other reaction(s): rash  Eggs Or Egg-Derived*        Comment:Other reaction(s): stomach discomfort       REVIEW OF SYSTEMS:   CONSTITUTIONAL:  Denies , fever, chills,   Fatigue improved.    ,     EENT:  Eyes:  Denies eye pain, visual changes,      E rales/rhonchi/wheezing.     ABDOMEN:  Soft, nondistended, nontender, bowel sounds normal in all 4 quadrants, no masses, no hepatosplenomegaly.     BACK: No tenderness, FROM.      EXTREMITIES:  No edema, no cyanosis,FROM, 2+ dorsalis pedis pulses bilaterally

## 2019-09-12 ENCOUNTER — OFFICE VISIT (OUTPATIENT)
Dept: NEUROLOGY | Facility: CLINIC | Age: 74
End: 2019-09-12
Payer: MEDICARE

## 2019-09-12 VITALS
RESPIRATION RATE: 14 BRPM | SYSTOLIC BLOOD PRESSURE: 124 MMHG | WEIGHT: 137 LBS | HEART RATE: 80 BPM | DIASTOLIC BLOOD PRESSURE: 66 MMHG | BODY MASS INDEX: 26 KG/M2

## 2019-09-12 DIAGNOSIS — G56.22 ULNAR NEUROPATHY AT ELBOW, LEFT: Primary | ICD-10-CM

## 2019-09-12 DIAGNOSIS — G35 HISTORY OF MULTIPLE SCLEROSIS (HCC): ICD-10-CM

## 2019-09-12 PROCEDURE — 99204 OFFICE O/P NEW MOD 45 MIN: CPT | Performed by: OTHER

## 2019-09-12 RX ORDER — GABAPENTIN 100 MG/1
100 CAPSULE ORAL 2 TIMES DAILY
COMMUNITY
End: 2019-12-10 | Stop reason: ALTCHOICE

## 2019-09-12 NOTE — PROGRESS NOTES
Pt reports that she has been \"fine. \" Pt has not had any exacerbations or flares recently. Pt states she was encouraged by Dr. Wilma Guzman to establish care to monitor MS. Pt states she has not been followed by Neurology recently.

## 2019-09-12 NOTE — PROGRESS NOTES
[facility]   Neurology- INITIAL CLINIC VISIT  2019, 11:44 AM     [place] Patient Status:  No patient class for patient encounter    1945 MRN [id number]   Location [address] [place] [address], disease and/or underlying chronic   microvascular ischemic disease. Comparison with any prior exams would be helpful to assess for interval change. There is mild to moderate global brain parenchymal volume loss.       Component      Latest Ref Rng & Units Headache             HEADACHE/N/V  Influenza Vaccines        Penicillin G              Adhesive Tape           RASH    Comment:RASH/BLISTERS IMMEDIATELY             RASH/BLISTERS IMMEDIATELY  Bacitracin              RASH    Comment:Other reaction(s): rash distress  Cardiac: Normal rate & regular rhythm  Lungs: Clear to auscultation bilaterally  Skin: There are no rashes or other skin lesions.   Musculoskeletal: There is no scoliosis, or joint deformities  Neurologic examination:  Mental status: Patient is al opportunity to ask questions. All questions and concerns were addressed.      Evin Yanez DO  Neuromuscular and General Neurology  [facility]

## 2019-11-12 ENCOUNTER — APPOINTMENT (OUTPATIENT)
Dept: LAB | Age: 74
End: 2019-11-12
Attending: FAMILY MEDICINE
Payer: MEDICARE

## 2019-11-12 DIAGNOSIS — E55.9 VITAMIN D DEFICIENCY: ICD-10-CM

## 2019-11-12 PROCEDURE — 82306 VITAMIN D 25 HYDROXY: CPT

## 2019-11-12 PROCEDURE — 36415 COLL VENOUS BLD VENIPUNCTURE: CPT

## 2019-11-13 ENCOUNTER — TELEPHONE (OUTPATIENT)
Dept: FAMILY MEDICINE CLINIC | Facility: CLINIC | Age: 74
End: 2019-11-13

## 2019-11-13 NOTE — TELEPHONE ENCOUNTER
Patient informed of the below results and recommendations. Patient will continue to take her vitamin d3 400 units BID and calcium with vitamin d BID.      Patient states she was taken off of flurbiprofen after it was interacting with some other medicati

## 2019-11-13 NOTE — TELEPHONE ENCOUNTER
----- Message from Dimple Contreras MD sent at 11/13/2019  5:58 AM CST -----  Labs reveiwed     Vit D in good range - continue with current dosing

## 2019-11-15 NOTE — TELEPHONE ENCOUNTER
These medications - anti-inflammatory medications - have been implicated in some further side effects , including heart disease and stroke.   If she wants to go back on this medication, she should come back in for follow up appointment , so that I can revie

## 2019-11-15 NOTE — TELEPHONE ENCOUNTER
Patient informed of the below and scheduled appointment to discuss with Dr. Trent Darden.      Future Appointments   Date Time Provider Akil Gibson   11/22/2019 10:30 AM Torie Murrieta MD [facility] [place] [facility] [place]   12/10/2019  9:20 AM Nixon Hassan

## 2019-11-22 ENCOUNTER — OFFICE VISIT (OUTPATIENT)
Dept: FAMILY MEDICINE CLINIC | Facility: CLINIC | Age: 74
End: 2019-11-22
Payer: MEDICARE

## 2019-11-22 VITALS
DIASTOLIC BLOOD PRESSURE: 62 MMHG | TEMPERATURE: 98 F | HEIGHT: 61 IN | SYSTOLIC BLOOD PRESSURE: 122 MMHG | OXYGEN SATURATION: 98 % | WEIGHT: 134.63 LBS | RESPIRATION RATE: 16 BRPM | HEART RATE: 76 BPM | BODY MASS INDEX: 25.42 KG/M2

## 2019-11-22 DIAGNOSIS — M19.041 PRIMARY OSTEOARTHRITIS OF BOTH HANDS: ICD-10-CM

## 2019-11-22 DIAGNOSIS — M19.071 PRIMARY OSTEOARTHRITIS OF BOTH FEET: ICD-10-CM

## 2019-11-22 DIAGNOSIS — M19.042 PRIMARY OSTEOARTHRITIS OF BOTH HANDS: ICD-10-CM

## 2019-11-22 DIAGNOSIS — M47.22 OSTEOARTHRITIS OF SPINE WITH RADICULOPATHY, CERVICAL REGION: ICD-10-CM

## 2019-11-22 DIAGNOSIS — M19.072 PRIMARY OSTEOARTHRITIS OF BOTH FEET: ICD-10-CM

## 2019-11-22 DIAGNOSIS — M54.12 CERVICAL RADICULOPATHY: Primary | ICD-10-CM

## 2019-11-22 PROCEDURE — 99214 OFFICE O/P EST MOD 30 MIN: CPT | Performed by: FAMILY MEDICINE

## 2019-11-22 RX ORDER — IBUPROFEN 800 MG
1 TABLET ORAL 2 TIMES DAILY
COMMUNITY

## 2019-11-22 NOTE — PATIENT INSTRUCTIONS
Restart flurbiprofen one daily  - take regularly until Jan 1  - stay off for 1 week     Then restart     Recheck with me February

## 2019-11-23 NOTE — PROGRESS NOTES
Chief Complaint:   Patient presents with:  Medication Follow-Up      HPI:   This is a 75year old female coming in for f/u care     Patient has not been doing well with her arthritis since being off of NSAIDs   Patient would like to go back onto them   Had currently           Current Meds:  Current Outpatient Medications   Medication Sig Dispense Refill   • Cholecalciferol (VITAMIN D3) 10 MCG (400 UNIT) Oral Cap Take 1 capsule by mouth 2 (two) times daily.      • gabapentin 100 MG Oral Cap Take 100 mg by mout RASH    Comment:Other reaction(s): rash  Eggs Or Egg-Derived*        Comment:Other reaction(s): stomach discomfort       REVIEW OF SYSTEMS:   CONSTITUTIONAL:  Denies , fever, chills, or fatigue,  EENT:  Eyes:  Denies eye pain, visual changes,   Ear sounds normal in all 4 quadrants, no masses, no hepatosplenomegaly. BACK: No tenderness, dec ROM at c spine    EXTREMITIES:    Hands:  Signs of OA in DIPs, PIPs ,     No edema, no cyanosis,FROM, 2+ dorsalis pedis pulses bilaterally.   NEURO:  No deficit, n

## 2019-12-10 ENCOUNTER — OFFICE VISIT (OUTPATIENT)
Dept: NEUROLOGY | Facility: CLINIC | Age: 74
End: 2019-12-10
Payer: MEDICARE

## 2019-12-10 VITALS
SYSTOLIC BLOOD PRESSURE: 120 MMHG | RESPIRATION RATE: 16 BRPM | BODY MASS INDEX: 26 KG/M2 | DIASTOLIC BLOOD PRESSURE: 72 MMHG | HEART RATE: 68 BPM | WEIGHT: 137 LBS

## 2019-12-10 DIAGNOSIS — M47.812 CERVICAL SPONDYLOSIS WITHOUT MYELOPATHY: ICD-10-CM

## 2019-12-10 DIAGNOSIS — G35 HISTORY OF MULTIPLE SCLEROSIS (HCC): Primary | ICD-10-CM

## 2019-12-10 DIAGNOSIS — G56.22 ULNAR NEUROPATHY AT ELBOW, LEFT: ICD-10-CM

## 2019-12-10 PROCEDURE — 99213 OFFICE O/P EST LOW 20 MIN: CPT | Performed by: OTHER

## 2019-12-10 RX ORDER — ACETAMINOPHEN 500 MG
500 TABLET ORAL AS NEEDED
COMMUNITY

## 2019-12-10 RX ORDER — AMLODIPINE BESYLATE 2.5 MG/1
2.5 TABLET ORAL DAILY
COMMUNITY
End: 2020-08-07

## 2019-12-10 RX ORDER — DIPHENHYDRAMINE HCL 25 MG
25 TABLET ORAL AS NEEDED
COMMUNITY

## 2019-12-10 NOTE — PROGRESS NOTES
[facility]   Neurology-     Saritamirna Butt Patient Status:  No patient class for patient encounter    1945 MRN [id number]   Location Loree Martins MD the supratentorial white matter with a nonspecific appearance which may be due to a combination of the patient's history of demyelinating disease and/or underlying chronic   microvascular ischemic disease.   Comparison with any prior exams would be helpful Comment:Other reaction(s): shaking in legs  Beef                        Comment:Other reaction(s): Gastritis             BEEF IN LARGE AMOUNTS - UPSET STOMACH  Codeine                     Comment:Other reaction(s): Headache             HEADACH 68   Resp: 16     General Appearance: Patient is a 75year old female in no acute distress  Cardiac: Normal rate & regular rhythm  Lungs: Clear to auscultation bilaterally  Skin: There are no rashes or other skin lesions.   Musculoskeletal: There is no scol her clinical presentation, is extremely low. At this time, hold off on repeat MRI. Follow up in 6 months.     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

## 2020-02-21 ENCOUNTER — TELEPHONE (OUTPATIENT)
Dept: FAMILY MEDICINE CLINIC | Facility: CLINIC | Age: 75
End: 2020-02-21

## 2020-02-21 ENCOUNTER — OFFICE VISIT (OUTPATIENT)
Dept: FAMILY MEDICINE CLINIC | Facility: CLINIC | Age: 75
End: 2020-02-21
Payer: MEDICARE

## 2020-02-21 VITALS
HEART RATE: 64 BPM | RESPIRATION RATE: 16 BRPM | DIASTOLIC BLOOD PRESSURE: 64 MMHG | SYSTOLIC BLOOD PRESSURE: 118 MMHG | TEMPERATURE: 97 F | BODY MASS INDEX: 25.49 KG/M2 | HEIGHT: 61 IN | OXYGEN SATURATION: 98 % | WEIGHT: 135 LBS

## 2020-02-21 DIAGNOSIS — M47.22 OSTEOARTHRITIS OF SPINE WITH RADICULOPATHY, CERVICAL REGION: Primary | ICD-10-CM

## 2020-02-21 DIAGNOSIS — Z23 NEED FOR VACCINATION: ICD-10-CM

## 2020-02-21 DIAGNOSIS — Z78.0 POSTMENOPAUSAL: ICD-10-CM

## 2020-02-21 PROCEDURE — 99214 OFFICE O/P EST MOD 30 MIN: CPT | Performed by: FAMILY MEDICINE

## 2020-02-21 PROCEDURE — G0009 ADMIN PNEUMOCOCCAL VACCINE: HCPCS | Performed by: FAMILY MEDICINE

## 2020-02-21 PROCEDURE — 90732 PPSV23 VACC 2 YRS+ SUBQ/IM: CPT | Performed by: FAMILY MEDICINE

## 2020-02-21 NOTE — PATIENT INSTRUCTIONS
Good exam       Change flurbiprofen to celebrex      Obtain pneumonia vaccination       Obtain dexa scan       Recheck with physical in 3 months

## 2020-02-21 NOTE — PROGRESS NOTES
Chief Complaint:   Patient presents with:   Follow - Up: here 3 month follow up on medication      HPI:   This is a 75year old female coming in for f/u care   Neck pain and arm numbness slightly better     Discussed NSAID use benefits/risks     Vit D level daily.     • amLODIPine Besylate 2.5 MG Oral Tab Take 2.5 mg by mouth daily. • Flurbiprofen 100 MG Oral Tab Take 100 mg by mouth daily. • acetaminophen 500 MG Oral Tab Take 500 mg by mouth as needed for Pain.      • diphenhydrAMINE HCl 25 MG Oral Ta stomach discomfort       REVIEW OF SYSTEMS:   CONSTITUTIONAL:  Denies , fever, chills, or fatigue,  EENT:  Eyes:  Denies eye pain, visual changes,     Ears, Nose, Throat:  Denies hearing loss,or disturbance.  Denies sore throat  INTEGUMENTARY:  Denies rashe No edema, no cyanosis,FROM, 2+ dorsalis pedis pulses bilaterally. NEURO:  No deficit, normal gait, strength and tone, sensory intact,   PSYCH: no depression or anxiety noted. ASSESSMENT AND PLAN:   1.  Osteoarthritis of spine with radiculopathy, cerv

## 2020-02-21 NOTE — TELEPHONE ENCOUNTER
Patient waiting for a prescription for Celebrex, pharmacy is [place] [address]   Please let her know

## 2020-02-24 RX ORDER — CELECOXIB 100 MG/1
100 CAPSULE ORAL DAILY
Qty: 30 CAPSULE | Refills: 12 | Status: SHIPPED | OUTPATIENT
Start: 2020-02-24 | End: 2020-02-28

## 2020-02-24 NOTE — TELEPHONE ENCOUNTER
Left message for patient to call the office back. Celebrex sent to [facility] for #30 with 12 refills.

## 2020-02-28 RX ORDER — CELECOXIB 100 MG/1
100 CAPSULE ORAL DAILY
Qty: 90 CAPSULE | Refills: 3 | Status: SHIPPED | OUTPATIENT
Start: 2020-02-28 | End: 2020-08-07

## 2020-02-28 NOTE — TELEPHONE ENCOUNTER
Future appt:     Your appointments     Date & Time Appointment Department ([address], [address])    May 26, 2020  9:00 AM CDT X[facility])    It is recommended that you wear a 2 piece outfit t [address]  [address] [address]  [facility], [address] [facility] [place]  [address] [facility], [facility] [address] [place] [id number] [phone] E

## 2020-05-18 ENCOUNTER — VIRTUAL PHONE E/M (OUTPATIENT)
Dept: NEUROLOGY | Facility: CLINIC | Age: 75
End: 2020-05-18
Payer: MEDICARE

## 2020-05-18 DIAGNOSIS — G56.22 ULNAR NEUROPATHY AT ELBOW, LEFT: ICD-10-CM

## 2020-05-18 DIAGNOSIS — M47.812 CERVICAL SPONDYLOSIS WITHOUT MYELOPATHY: ICD-10-CM

## 2020-05-18 DIAGNOSIS — G35 HISTORY OF MULTIPLE SCLEROSIS (HCC): Primary | ICD-10-CM

## 2020-05-18 PROCEDURE — 99442 PHONE E/M BY PHYS 11-20 MIN: CPT | Performed by: OTHER

## 2020-05-18 NOTE — PROGRESS NOTES
Virtual Telephone Check-In    Shiv Santamaria verbally consents to a Virtual/Telephone Check-In visit on 05/18/20.     Patient understands and accepts financial responsibility for any deductible, co-insurance and/or co-pays associated with this ser cervical 6/2019  CONCLUSION:  Degenerative changes as described above greatest at the C5-C6 level. MRI brain 6/2019  CONCLUSION:       1. No acute intracranial abnormality identified.      2. There are moderate focal and confluent areas of T2/FLAIR hyper OTHER (SEE COMMENTS)    Comment:Other reaction(s): blurred vision, dizzy,             vomiting, headache  Bacitracin-Polymyxi*        Comment:Other reaction(s): rash  Latex                       Comment:Other reaction(s): rash  L 2 (two) times daily. , Disp: , Rfl:   •  Omega-3 1000 MG Oral Cap, Take 1 tablet by mouth daily. , Disp: , Rfl:   •  Calcium Carb-Cholecalciferol (CALCIUM 600 + D) 600-200 MG-UNIT Oral Tab, Take 1 tablet by mouth 2 (two) times daily. , Disp: , Rfl:       Revi exercises, on Celebrex    Left ulnar neuropathy at elbow-  Resolved  Return to clinic prn    Requested Prescriptions      No prescriptions requested or ordered in this encounter          We discussed in depth regarding the diagnosis, prognosis, treatment.

## 2020-06-18 NOTE — TELEPHONE ENCOUNTER
Please advise refill of Simvastatin 10mg. Last Rx: 5/29/19    Future appt:     Your appointments     Date & Time Appointment Department [facility])    Aug 07, 2020  9:00 AM CDT XR DEXA BONE DENSITOMETRY with [facility] PORTIA DEXA RM [address] [facility] (EDW [address], [address],  [address] [phone]        Last Appointment with provider:   2/21/2020 - per notes recheck with physical in 3 months

## 2020-06-19 RX ORDER — SIMVASTATIN 10 MG
10 TABLET ORAL
Qty: 90 TABLET | Refills: 0 | Status: SHIPPED | OUTPATIENT
Start: 2020-06-19 | End: 2020-08-07

## 2020-07-31 DIAGNOSIS — I10 BENIGN ESSENTIAL HYPERTENSION: Primary | ICD-10-CM

## 2020-07-31 NOTE — TELEPHONE ENCOUNTER
Future appt:     Your appointments     Date & Time Appointment Department [facility])    Aug 07, 2020  9:00 AM CDT XR DEXA BONE DENSITOMETRY with [facility])    It is recommended that you wear a 2 piece outfit t [facility] [place]  [address] [id number]  [phone]        Last Appointment with provider:   2/21/2020  Last appointment at [facility]:  2/21/2020  Cholesterol, Total (mg/dL)   Date Value   05/29/2019 200 (H)     HDL Cholest

## 2020-08-07 ENCOUNTER — OFFICE VISIT (OUTPATIENT)
Dept: FAMILY MEDICINE CLINIC | Facility: CLINIC | Age: 75
End: 2020-08-07
Payer: MEDICARE

## 2020-08-07 ENCOUNTER — HOSPITAL ENCOUNTER (OUTPATIENT)
Dept: BONE DENSITY | Age: 75
Discharge: HOME | End: 2020-08-07
Attending: FAMILY MEDICINE
Payer: MEDICARE

## 2020-08-07 VITALS
WEIGHT: 133 LBS | DIASTOLIC BLOOD PRESSURE: 62 MMHG | SYSTOLIC BLOOD PRESSURE: 114 MMHG | RESPIRATION RATE: 12 BRPM | TEMPERATURE: 97 F | HEART RATE: 62 BPM | HEIGHT: 60.5 IN | BODY MASS INDEX: 25.44 KG/M2

## 2020-08-07 DIAGNOSIS — M19.042 PRIMARY OSTEOARTHRITIS OF BOTH HANDS: ICD-10-CM

## 2020-08-07 DIAGNOSIS — M85.851 OSTEOPENIA OF THIGH, BILATERAL: ICD-10-CM

## 2020-08-07 DIAGNOSIS — M19.041 PRIMARY OSTEOARTHRITIS OF BOTH HANDS: ICD-10-CM

## 2020-08-07 DIAGNOSIS — M54.12 CERVICAL RADICULOPATHY: ICD-10-CM

## 2020-08-07 DIAGNOSIS — Z00.00 ENCOUNTER FOR ANNUAL HEALTH EXAMINATION: Primary | ICD-10-CM

## 2020-08-07 DIAGNOSIS — M85.852 OSTEOPENIA OF THIGH, BILATERAL: ICD-10-CM

## 2020-08-07 DIAGNOSIS — E78.00 PURE HYPERCHOLESTEROLEMIA: ICD-10-CM

## 2020-08-07 DIAGNOSIS — Z78.0 POSTMENOPAUSAL: ICD-10-CM

## 2020-08-07 DIAGNOSIS — E55.9 VITAMIN D DEFICIENCY: ICD-10-CM

## 2020-08-07 DIAGNOSIS — I10 BENIGN ESSENTIAL HYPERTENSION: ICD-10-CM

## 2020-08-07 PROBLEM — M19.072 PRIMARY OSTEOARTHRITIS OF BOTH FEET: Status: RESOLVED | Noted: 2019-11-22 | Resolved: 2020-08-07

## 2020-08-07 PROBLEM — M19.071 PRIMARY OSTEOARTHRITIS OF BOTH FEET: Status: RESOLVED | Noted: 2019-11-22 | Resolved: 2020-08-07

## 2020-08-07 PROCEDURE — 77080 DXA BONE DENSITY AXIAL: CPT | Performed by: FAMILY MEDICINE

## 2020-08-07 PROCEDURE — 99213 OFFICE O/P EST LOW 20 MIN: CPT | Performed by: FAMILY MEDICINE

## 2020-08-07 PROCEDURE — G0439 PPPS, SUBSEQ VISIT: HCPCS | Performed by: FAMILY MEDICINE

## 2020-08-07 RX ORDER — SIMVASTATIN 10 MG
10 TABLET ORAL
Qty: 90 TABLET | Refills: 3 | Status: SHIPPED | OUTPATIENT
Start: 2020-08-07 | End: 2021-09-24

## 2020-08-07 RX ORDER — CELECOXIB 100 MG/1
100 CAPSULE ORAL DAILY
Qty: 90 CAPSULE | Refills: 3 | Status: SHIPPED | OUTPATIENT
Start: 2020-08-07 | End: 2021-09-20

## 2020-08-07 NOTE — PROGRESS NOTES
Chief Complaint:   Patient presents with: Well Adult: medicare annual      HPI:   This is a 67year old female coming in for healthcare check. I discussed with her hypertension.   Patient had been under a lot of discomfort and stresses as patient was on tobacco: Never Used    Substance and Sexual Activity      Alcohol use:  Yes        Alcohol/week: 0.0 standard drinks        Comment: occasional- holidays      Drug use: No    Other Topics      Concerns:        Caffeine Concern: Yes          [address] Comment:Other reaction(s): Headache             HEADACHE/N/V  Influenza Vaccines        Penicillin G              Adhesive Tape           RASH    Comment:RASH/BLISTERS IMMEDIATELY             RASH/BLISTERS IMMEDIATELY  Bacitracin awake and oriented, well developed, well nourished female-   , no apparent distress.   HEENT:  Head:  Normocephalic, atraumatic     Eyes: EOMI, PERRLA, no scleral icterus, conjunctivae clear bilaterally, no eye discharge   Ears: External normal. Nose: paten tablet 3     Sig: Take 1 tablet (25 mg total) by mouth once daily. Patient Instructions   Good exam       Stop Amlodipine. Stay active    Recheck in 6 months.              Patient/Caregiver Education: Patient/Caregiver Education: There are no barr

## 2020-08-10 ENCOUNTER — TELEPHONE (OUTPATIENT)
Dept: FAMILY MEDICINE CLINIC | Facility: CLINIC | Age: 75
End: 2020-08-10

## 2020-08-10 NOTE — TELEPHONE ENCOUNTER
----- Message from Ghazal Parry MD sent at 8/9/2020  1:39 PM CDT -----  Xray report reviewed    Spine ok     Hip with osteopenia - but improved c/w prior study    Recommend continue with current regimen

## 2021-01-14 ENCOUNTER — LABORATORY ENCOUNTER (OUTPATIENT)
Dept: LAB | Age: 76
End: 2021-01-14
Attending: ALLERGY & IMMUNOLOGY
Payer: MEDICARE

## 2021-01-14 DIAGNOSIS — Z91.018 FOOD ALLERGY: Primary | ICD-10-CM

## 2021-01-14 PROCEDURE — 36415 COLL VENOUS BLD VENIPUNCTURE: CPT

## 2021-01-14 PROCEDURE — 86003 ALLG SPEC IGE CRUDE XTRC EA: CPT

## 2021-01-18 LAB
EGG WHITE IGE QN: <0.1 KUA/L (ref ?–0.1)
EGG YOLK IGE QN: <0.1 KUA/L (ref ?–0.1)

## 2021-02-08 ENCOUNTER — OFFICE VISIT (OUTPATIENT)
Dept: FAMILY MEDICINE CLINIC | Facility: CLINIC | Age: 76
End: 2021-02-08
Payer: MEDICARE

## 2021-02-08 VITALS
SYSTOLIC BLOOD PRESSURE: 124 MMHG | WEIGHT: 137.81 LBS | HEART RATE: 64 BPM | HEIGHT: 60.5 IN | BODY MASS INDEX: 26.36 KG/M2 | TEMPERATURE: 98 F | DIASTOLIC BLOOD PRESSURE: 82 MMHG | RESPIRATION RATE: 16 BRPM | OXYGEN SATURATION: 98 %

## 2021-02-08 DIAGNOSIS — M19.041 PRIMARY OSTEOARTHRITIS OF BOTH HANDS: Primary | ICD-10-CM

## 2021-02-08 DIAGNOSIS — I10 BENIGN ESSENTIAL HYPERTENSION: ICD-10-CM

## 2021-02-08 DIAGNOSIS — M54.12 CERVICAL RADICULOPATHY: ICD-10-CM

## 2021-02-08 DIAGNOSIS — M19.042 PRIMARY OSTEOARTHRITIS OF BOTH HANDS: Primary | ICD-10-CM

## 2021-02-08 PROCEDURE — 99214 OFFICE O/P EST MOD 30 MIN: CPT | Performed by: FAMILY MEDICINE

## 2021-02-08 NOTE — PROGRESS NOTES
Chief Complaint:   Patient presents with: Follow - Up: 6 month check      HPI:   This is a 67year old female coming in for f/u care   Feeling well     I discussed with her hypertension. Her neck pain and radiculopathy have improved dramatically.   Sh Outpatient Medications   Medication Sig Dispense Refill   • simvastatin 10 MG Oral Tab Take 1 tablet (10 mg total) by mouth once daily. 90 tablet 3   • celecoxib (CELEBREX) 100 MG Oral Cap Take 1 capsule (100 mg total) by mouth daily.  90 capsule 3   • meto reaction(s): stomach discomfort       REVIEW OF SYSTEMS:   CONSTITUTIONAL:  Denies , fever, chills, or fatigue,unusual weight gain/loss     EENT:  Eyes:  Denies eye pain, visual changes, blurred vision, double vision .   Ears, Nose, Throat:  Denies hearing tonsillar erythema or exudate.    Mouth:  No oral lesions or ulcerations, good dentition.     NECK: Supple, no CLAD, no JVD, no thyromegaly.   SKIN: No rashes, no skin lesion, no bruising, good turgor.     HEART:  Regular rate and rhythm, no murmurs,   LUNG

## 2021-03-13 DIAGNOSIS — Z23 NEED FOR VACCINATION: ICD-10-CM

## 2021-03-24 ENCOUNTER — TELEPHONE (OUTPATIENT)
Dept: FAMILY MEDICINE CLINIC | Facility: CLINIC | Age: 76
End: 2021-03-24

## 2021-03-24 NOTE — TELEPHONE ENCOUNTER
1st moderna vaccine on March 17, 2nd is scheduled for 4/14/21. had done at [facility]. had message on mychar to sched at [facility]

## 2021-04-30 ENCOUNTER — TELEPHONE (OUTPATIENT)
Dept: FAMILY MEDICINE CLINIC | Facility: CLINIC | Age: 76
End: 2021-04-30

## 2021-04-30 DIAGNOSIS — E78.00 PURE HYPERCHOLESTEROLEMIA: Primary | ICD-10-CM

## 2021-04-30 DIAGNOSIS — E55.9 VITAMIN D DEFICIENCY: ICD-10-CM

## 2021-04-30 DIAGNOSIS — I10 BENIGN ESSENTIAL HYPERTENSION: ICD-10-CM

## 2021-04-30 NOTE — TELEPHONE ENCOUNTER
Future Appointments   Date Time Provider Akil Enriquezi   8/13/2021  9:15 AM Mindy Loomis MD [facility] [place] [facility] [place]     Please advise if patient should come in for fasting lab appointment prior to her annual.

## 2021-05-03 NOTE — TELEPHONE ENCOUNTER
Patient scheduled.      Future Appointments   Date Time Provider Akil Paige   8/9/2021  8:45 AM REF [place] REF [facility] Ref [facility]   8/13/2021  9:15 AM Mindy Loomis MD [facility] [place] [facility] [place]

## 2021-06-16 ENCOUNTER — TELEPHONE (OUTPATIENT)
Dept: FAMILY MEDICINE CLINIC | Facility: CLINIC | Age: 76
End: 2021-06-16

## 2021-06-16 ENCOUNTER — OFFICE VISIT (OUTPATIENT)
Dept: FAMILY MEDICINE CLINIC | Facility: CLINIC | Age: 76
End: 2021-06-16
Payer: MEDICARE

## 2021-06-16 ENCOUNTER — LAB ENCOUNTER (OUTPATIENT)
Dept: LAB | Age: 76
End: 2021-06-16
Attending: FAMILY MEDICINE
Payer: MEDICARE

## 2021-06-16 ENCOUNTER — HOSPITAL ENCOUNTER (OUTPATIENT)
Dept: GENERAL RADIOLOGY | Age: 76
Discharge: HOME | End: 2021-06-16
Attending: FAMILY MEDICINE
Payer: MEDICARE

## 2021-06-16 VITALS
WEIGHT: 132.63 LBS | TEMPERATURE: 97 F | DIASTOLIC BLOOD PRESSURE: 70 MMHG | SYSTOLIC BLOOD PRESSURE: 122 MMHG | BODY MASS INDEX: 25.37 KG/M2 | RESPIRATION RATE: 16 BRPM | OXYGEN SATURATION: 97 % | HEIGHT: 60.5 IN | HEART RATE: 67 BPM

## 2021-06-16 DIAGNOSIS — M67.40 MUCOID CYST, JOINT: ICD-10-CM

## 2021-06-16 DIAGNOSIS — I10 BENIGN ESSENTIAL HYPERTENSION: ICD-10-CM

## 2021-06-16 DIAGNOSIS — E78.00 PURE HYPERCHOLESTEROLEMIA: ICD-10-CM

## 2021-06-16 DIAGNOSIS — Z01.818 PREOP EXAMINATION: ICD-10-CM

## 2021-06-16 DIAGNOSIS — Z01.818 PREOP EXAMINATION: Primary | ICD-10-CM

## 2021-06-16 PROCEDURE — 85025 COMPLETE CBC W/AUTO DIFF WBC: CPT

## 2021-06-16 PROCEDURE — 80053 COMPREHEN METABOLIC PANEL: CPT

## 2021-06-16 PROCEDURE — 99215 OFFICE O/P EST HI 40 MIN: CPT | Performed by: FAMILY MEDICINE

## 2021-06-16 PROCEDURE — 71046 X-RAY EXAM CHEST 2 VIEWS: CPT | Performed by: FAMILY MEDICINE

## 2021-06-16 PROCEDURE — 93000 ELECTROCARDIOGRAM COMPLETE: CPT | Performed by: FAMILY MEDICINE

## 2021-06-16 PROCEDURE — 36415 COLL VENOUS BLD VENIPUNCTURE: CPT

## 2021-06-16 NOTE — PROGRESS NOTES
Patient's preoperative chest x-ray, EKG, laboratory studies reviewed. Patient medically stable to proceed with surgery on her right foot.

## 2021-06-16 NOTE — PATIENT INSTRUCTIONS
Pt to stop celebrex 1 week before surgery. NO aspirin, advil or aleve during that week.   Tylenol ok to take if needed      cxr  Today    Labs today    Continue other medication

## 2021-06-16 NOTE — PROGRESS NOTES
[address]  PROGRESS NOTE  Chief Complaint:   Patient presents with:  Pre-Op Exam: Toe surgery on 06/24/2021 with . HPI:   This is a 67year old female coming in for preoperative evaluation.   Pt with cyst on foot, no pain status: Never Smoker      Smokeless tobacco: Never Used    Vaping Use      Vaping Use: Never used    Substance and Sexual Activity      Alcohol use:  Yes        Alcohol/week: 0.0 standard drinks        Comment: occasional- holidays      Drug use: No    Othe as needed for Itching. • Cholecalciferol (VITAMIN D3) 10 MCG (400 UNIT) Oral Cap Take 1 capsule by mouth 2 (two) times daily. • Co-Enzyme Q-10 30 MG Oral Cap Take 30 mg by mouth daily.      • Polyethyl Glycol-Propyl Glycol (SYSTANE) 0.4-0.3 % Ophtha as calculated from the following:    Height as of this encounter: 5' 0.5\" (1.537 m). Weight as of this encounter: 132 lb 9.6 oz (60.1 kg). Vital signs reviewed. Appears stated age, well groomed.   Physical Exam:  GEN:  Patient is alert, awake and orien (CPT=71046); Future    3. Pure hypercholesterolemia  Unchanged patient due for general labs and physical with Dr. Lin Erazo in August.  - COMP METABOLIC PANEL (14); Future  - CBC WITH DIFFERENTIAL WITH PLATELET; Future    4.  Benign essential hypertension

## 2021-06-16 NOTE — TELEPHONE ENCOUNTER
----- Message from Omar Nyhan, MD sent at 6/16/2021  6:06 PM CDT -----  Laboratory results reviewed. Chemistry profile essentially normal with slight decrease in renal function. This is stable at patient's baseline.   Patient's CBC also shows stabi

## 2021-08-09 ENCOUNTER — LABORATORY ENCOUNTER (OUTPATIENT)
Dept: LAB | Age: 76
End: 2021-08-09
Attending: FAMILY MEDICINE
Payer: MEDICARE

## 2021-08-09 DIAGNOSIS — E78.00 PURE HYPERCHOLESTEROLEMIA: ICD-10-CM

## 2021-08-09 DIAGNOSIS — I10 BENIGN ESSENTIAL HYPERTENSION: ICD-10-CM

## 2021-08-09 DIAGNOSIS — E55.9 VITAMIN D DEFICIENCY: ICD-10-CM

## 2021-08-09 LAB
ALBUMIN SERPL-MCNC: 3.6 G/DL (ref 3.4–5)
ALBUMIN/GLOB SERPL: 0.8 {RATIO} (ref 1–2)
ALP LIVER SERPL-CCNC: 76 U/L
ALT SERPL-CCNC: 32 U/L
ANION GAP SERPL CALC-SCNC: 4 MMOL/L (ref 0–18)
AST SERPL-CCNC: 26 U/L (ref 15–37)
BASOPHILS # BLD AUTO: 0.07 X10(3) UL (ref 0–0.2)
BASOPHILS NFR BLD AUTO: 0.9 %
BILIRUB SERPL-MCNC: 0.4 MG/DL (ref 0.1–2)
BILIRUB UR QL STRIP.AUTO: NEGATIVE
BUN BLD-MCNC: 15 MG/DL (ref 7–18)
CALCIUM BLD-MCNC: 9.3 MG/DL (ref 8.5–10.1)
CHLORIDE SERPL-SCNC: 109 MMOL/L (ref 98–112)
CHOLEST SMN-MCNC: 168 MG/DL (ref ?–200)
CLARITY UR REFRACT.AUTO: CLEAR
CO2 SERPL-SCNC: 27 MMOL/L (ref 21–32)
COLOR UR AUTO: YELLOW
CREAT BLD-MCNC: 1.07 MG/DL
EOSINOPHIL # BLD AUTO: 0.04 X10(3) UL (ref 0–0.7)
EOSINOPHIL NFR BLD AUTO: 0.5 %
ERYTHROCYTE [DISTWIDTH] IN BLOOD BY AUTOMATED COUNT: 12.8 %
GLOBULIN PLAS-MCNC: 4.4 G/DL (ref 2.8–4.4)
GLUCOSE BLD-MCNC: 86 MG/DL (ref 70–99)
GLUCOSE UR STRIP.AUTO-MCNC: NEGATIVE MG/DL
HCT VFR BLD AUTO: 39.5 %
HDLC SERPL-MCNC: 64 MG/DL (ref 40–59)
HGB BLD-MCNC: 13.1 G/DL
IMM GRANULOCYTES # BLD AUTO: 0.02 X10(3) UL (ref 0–1)
IMM GRANULOCYTES NFR BLD: 0.2 %
KETONES UR STRIP.AUTO-MCNC: NEGATIVE MG/DL
LDLC SERPL CALC-MCNC: 84 MG/DL (ref ?–100)
LYMPHOCYTES # BLD AUTO: 2.49 X10(3) UL (ref 1–4)
LYMPHOCYTES NFR BLD AUTO: 30.6 %
M PROTEIN MFR SERPL ELPH: 8 G/DL (ref 6.4–8.2)
MCH RBC QN AUTO: 33.4 PG (ref 26–34)
MCHC RBC AUTO-ENTMCNC: 33.2 G/DL (ref 31–37)
MCV RBC AUTO: 100.8 FL
MONOCYTES # BLD AUTO: 0.45 X10(3) UL (ref 0.1–1)
MONOCYTES NFR BLD AUTO: 5.5 %
NEUTROPHILS # BLD AUTO: 5.06 X10 (3) UL (ref 1.5–7.7)
NEUTROPHILS # BLD AUTO: 5.06 X10(3) UL (ref 1.5–7.7)
NEUTROPHILS NFR BLD AUTO: 62.3 %
NITRITE UR QL STRIP.AUTO: NEGATIVE
NONHDLC SERPL-MCNC: 104 MG/DL (ref ?–130)
OSMOLALITY SERPL CALC.SUM OF ELEC: 290 MOSM/KG (ref 275–295)
PATIENT FASTING Y/N/NP: YES
PATIENT FASTING Y/N/NP: YES
PH UR STRIP.AUTO: 7 [PH] (ref 5–8)
PLATELET # BLD AUTO: 237 10(3)UL (ref 150–450)
POTASSIUM SERPL-SCNC: 4.4 MMOL/L (ref 3.5–5.1)
PROT UR STRIP.AUTO-MCNC: NEGATIVE MG/DL
RBC # BLD AUTO: 3.92 X10(6)UL
RBC UR QL AUTO: NEGATIVE
SODIUM SERPL-SCNC: 140 MMOL/L (ref 136–145)
SP GR UR STRIP.AUTO: 1.01 (ref 1–1.03)
TRIGL SERPL-MCNC: 114 MG/DL (ref 30–149)
TSI SER-ACNC: 3.68 MIU/ML (ref 0.36–3.74)
UROBILINOGEN UR STRIP.AUTO-MCNC: <2 MG/DL
VIT D+METAB SERPL-MCNC: 36.7 NG/ML (ref 30–100)
VLDLC SERPL CALC-MCNC: 18 MG/DL (ref 0–30)
WBC # BLD AUTO: 8.1 X10(3) UL (ref 4–11)

## 2021-08-09 PROCEDURE — 80061 LIPID PANEL: CPT

## 2021-08-09 PROCEDURE — 85025 COMPLETE CBC W/AUTO DIFF WBC: CPT

## 2021-08-09 PROCEDURE — 36415 COLL VENOUS BLD VENIPUNCTURE: CPT

## 2021-08-09 PROCEDURE — 82306 VITAMIN D 25 HYDROXY: CPT

## 2021-08-09 PROCEDURE — 84443 ASSAY THYROID STIM HORMONE: CPT

## 2021-08-09 PROCEDURE — 80053 COMPREHEN METABOLIC PANEL: CPT

## 2021-08-09 PROCEDURE — 81001 URINALYSIS AUTO W/SCOPE: CPT

## 2021-08-13 ENCOUNTER — OFFICE VISIT (OUTPATIENT)
Dept: FAMILY MEDICINE CLINIC | Facility: CLINIC | Age: 76
End: 2021-08-13
Payer: MEDICARE

## 2021-08-13 VITALS
OXYGEN SATURATION: 99 % | HEIGHT: 60.5 IN | TEMPERATURE: 98 F | WEIGHT: 129.19 LBS | BODY MASS INDEX: 24.71 KG/M2 | HEART RATE: 62 BPM | DIASTOLIC BLOOD PRESSURE: 72 MMHG | SYSTOLIC BLOOD PRESSURE: 110 MMHG | RESPIRATION RATE: 16 BRPM

## 2021-08-13 DIAGNOSIS — Z00.00 ENCOUNTER FOR ANNUAL HEALTH EXAMINATION: Primary | ICD-10-CM

## 2021-08-13 DIAGNOSIS — E55.9 VITAMIN D DEFICIENCY: ICD-10-CM

## 2021-08-13 DIAGNOSIS — E78.00 PURE HYPERCHOLESTEROLEMIA: ICD-10-CM

## 2021-08-13 DIAGNOSIS — I10 BENIGN ESSENTIAL HYPERTENSION: ICD-10-CM

## 2021-08-13 PROBLEM — M19.041 PRIMARY OSTEOARTHRITIS OF BOTH HANDS: Status: RESOLVED | Noted: 2019-11-22 | Resolved: 2021-08-13

## 2021-08-13 PROBLEM — M54.12 CERVICAL RADICULOPATHY: Status: RESOLVED | Noted: 2019-06-28 | Resolved: 2021-08-13

## 2021-08-13 PROBLEM — M19.042 PRIMARY OSTEOARTHRITIS OF BOTH HANDS: Status: RESOLVED | Noted: 2019-11-22 | Resolved: 2021-08-13

## 2021-08-13 PROBLEM — M67.40 MUCOID CYST, JOINT: Status: RESOLVED | Noted: 2021-06-03 | Resolved: 2021-08-13

## 2021-08-13 PROCEDURE — G0439 PPPS, SUBSEQ VISIT: HCPCS | Performed by: FAMILY MEDICINE

## 2021-08-13 PROCEDURE — 99213 OFFICE O/P EST LOW 20 MIN: CPT | Performed by: FAMILY MEDICINE

## 2021-08-13 NOTE — PROGRESS NOTES
Chief Complaint:   Patient presents with: Well Adult      HPI:   This is a 67year old female coming in for healthcare check. Feeling well. Had recent laboratory testing-reviewed. Chemistry profile with normal blood sugar.  Kidney function and liver TSH 3.680 0.358 - 3.740 mIU/mL   URINALYSIS WITH CULTURE REFLEX    Specimen: Urine   Result Value Ref Range    Urine Color Yellow Yellow    Clarity Urine Clear Clear    Spec Gravity 1.015 1.001 - 1.030    Glucose Urine Negative Negative mg/dL    Bilirubin Typically AP SPINE    • Primary osteoarthritis of both feet 11/22/2019   • Primary osteoarthritis of both hands 11/22/2019   • Vitamin D deficiency 8/13/2019      Past Surgical History:   Procedure Laterality Date   • CATARACT     • HYSTERECTOMY     • SKIN Allergies:    Anesthesia S-I-40  *    OTHER (SEE COMMENTS), Tightness in                            Chest  Hydrocodone-Acetami*    DIZZINESS, NAUSEA AND VOMITING,                            OTHER (SEE COMMENTS)    Comment:Other reaction(s): blurred  Denies depression or anxiety.     ENDOCRINOLOGIC:  Denies cold or heat intolerance, polyuria or polydipsia.   ALLERGIES:  Denies allergic response,    EXAM:   /72 (BP Location: Left arm, Patient Position: Sitting, Cuff Size: adult)   Pulse 62   Temp nutrition    4.  Vitamin D deficiency  Recommend increased dosing of vitamin D.        Meds & Refills for this Visit:  Requested Prescriptions      No prescriptions requested or ordered in this encounter       Patient Instructions   Good exam       Recheck

## 2021-09-20 RX ORDER — CELECOXIB 100 MG/1
100 CAPSULE ORAL DAILY
Qty: 90 CAPSULE | Refills: 3 | Status: SHIPPED | OUTPATIENT
Start: 2021-09-20

## 2021-09-20 NOTE — TELEPHONE ENCOUNTER
Future appt:     Your appointments     Date & Time Appointment Department [facility])    Feb 15, 2022  8:45 AM CST Exam - Established with Tonie Prasad MD [address] [facility] [place] ([facility])            [employer]

## 2021-09-24 DIAGNOSIS — E78.00 PURE HYPERCHOLESTEROLEMIA: Primary | ICD-10-CM

## 2021-09-24 RX ORDER — SIMVASTATIN 10 MG
10 TABLET ORAL
Qty: 90 TABLET | Refills: 1 | Status: SHIPPED | OUTPATIENT
Start: 2021-09-24

## 2021-09-24 NOTE — TELEPHONE ENCOUNTER
Future appt:     Your appointments     Date & Time Appointment Department [facility])    Feb 15, 2022  8:45 AM CST Exam - Established with Anyi Negron MD 25 [facility] ([facility])            [employer]

## 2021-10-20 ENCOUNTER — TELEPHONE (OUTPATIENT)
Dept: FAMILY MEDICINE CLINIC | Facility: CLINIC | Age: 76
End: 2021-10-20

## 2021-10-20 NOTE — TELEPHONE ENCOUNTER
Pt states she has seen an allergist in the past- pt states it was determined that he has GI upset with eggs, not a true allergy reported. Pt states she had discussed vaccines with allergist as well.   Pt states it was determined earlier in the year that pt

## 2022-02-07 NOTE — TELEPHONE ENCOUNTER
Metoprolol Rx refill last sent on 8/7/2020. Spoke to patient, she confirmed that she is still taking this medication and that Dr. Lashon Iyer is managing her medication. She does see Dr. Zenon Beltran and states that he does not refill this medication for her. Future appt: Your appointments     Date & Time Appointment Department [facility])    Feb 15, 2022  8:45 AM CST Exam - Established with Alfreda Madrigal MD [address] [id number]  [phone]        Last Appointment with provider:   8/13/2021 with TR for annual wellness and plan for recheck in 6 months. Last appointment at [facility]:  8/13/2021  Cholesterol, Total (mg/dL)   Date Value   08/09/2021 168     HDL Cholesterol (mg/dL)   Date Value   08/09/2021 64 (H)     LDL Cholesterol (mg/dL)   Date Value   08/09/2021 84     Triglycerides (mg/dL)   Date Value   08/09/2021 114     No results found for: EAG, A1C  Lab Results   Component Value Date    TSH 3.680 08/09/2021       No follow-ups on file.

## 2022-02-09 ENCOUNTER — TELEPHONE (OUTPATIENT)
Dept: FAMILY MEDICINE CLINIC | Facility: CLINIC | Age: 77
End: 2022-02-09

## 2022-02-09 NOTE — TELEPHONE ENCOUNTER
Pneumonia - last given on 12/8/2017  Tdap - last given on 3/22/2016    Please advise on Shingles Vaccine and Pneumococcal Vaccine.

## 2022-02-09 NOTE — TELEPHONE ENCOUNTER
Pt wants to know when she had her last pneumonia/ tetanus vaccine and if she is due for any of those vaccines. Also, pt states that before she refused the shingles vaccine, states that she does not recall having chicken pox but states that she has no one to ask if she forsure didn't. Wants to know if it's a good idea for her to get the vaccine. Would like a call back.

## 2022-02-14 NOTE — TELEPHONE ENCOUNTER
Left detailed message per Dr. Jordan Laguna. Armand's note regarding pt's vaccines. Encouraged pt to call back office with any questions.

## 2022-02-15 ENCOUNTER — OFFICE VISIT (OUTPATIENT)
Dept: FAMILY MEDICINE CLINIC | Facility: CLINIC | Age: 77
End: 2022-02-15
Payer: MEDICARE

## 2022-02-15 ENCOUNTER — LAB ENCOUNTER (OUTPATIENT)
Dept: LAB | Age: 77
End: 2022-02-15
Attending: FAMILY MEDICINE
Payer: MEDICARE

## 2022-02-15 VITALS
RESPIRATION RATE: 16 BRPM | OXYGEN SATURATION: 98 % | BODY MASS INDEX: 25.24 KG/M2 | HEIGHT: 60.5 IN | WEIGHT: 132 LBS | HEART RATE: 65 BPM | SYSTOLIC BLOOD PRESSURE: 128 MMHG | DIASTOLIC BLOOD PRESSURE: 80 MMHG | TEMPERATURE: 96 F

## 2022-02-15 DIAGNOSIS — E55.9 VITAMIN D DEFICIENCY: Primary | ICD-10-CM

## 2022-02-15 DIAGNOSIS — M54.12 CERVICAL RADICULOPATHY: ICD-10-CM

## 2022-02-15 DIAGNOSIS — I10 BENIGN ESSENTIAL HYPERTENSION: ICD-10-CM

## 2022-02-15 LAB
ALBUMIN SERPL-MCNC: 3.7 G/DL (ref 3.4–5)
ALBUMIN/GLOB SERPL: 0.9 {RATIO} (ref 1–2)
ALP LIVER SERPL-CCNC: 73 U/L
ALT SERPL-CCNC: 29 U/L
ANION GAP SERPL CALC-SCNC: 4 MMOL/L (ref 0–18)
AST SERPL-CCNC: 25 U/L (ref 15–37)
BILIRUB SERPL-MCNC: 0.4 MG/DL (ref 0.1–2)
BUN BLD-MCNC: 22 MG/DL (ref 7–18)
CALCIUM BLD-MCNC: 9.8 MG/DL (ref 8.5–10.1)
CHLORIDE SERPL-SCNC: 112 MMOL/L (ref 98–112)
CO2 SERPL-SCNC: 26 MMOL/L (ref 21–32)
CREAT BLD-MCNC: 0.99 MG/DL
FASTING STATUS PATIENT QL REPORTED: YES
GLOBULIN PLAS-MCNC: 3.9 G/DL (ref 2.8–4.4)
GLUCOSE BLD-MCNC: 88 MG/DL (ref 70–99)
OSMOLALITY SERPL CALC.SUM OF ELEC: 297 MOSM/KG (ref 275–295)
POTASSIUM SERPL-SCNC: 4.4 MMOL/L (ref 3.5–5.1)
PROT SERPL-MCNC: 7.6 G/DL (ref 6.4–8.2)
SODIUM SERPL-SCNC: 142 MMOL/L (ref 136–145)
VIT D+METAB SERPL-MCNC: 40.1 NG/ML (ref 30–100)

## 2022-02-15 PROCEDURE — 82306 VITAMIN D 25 HYDROXY: CPT | Performed by: FAMILY MEDICINE

## 2022-02-15 PROCEDURE — 80053 COMPREHEN METABOLIC PANEL: CPT | Performed by: FAMILY MEDICINE

## 2022-02-15 PROCEDURE — 36415 COLL VENOUS BLD VENIPUNCTURE: CPT | Performed by: FAMILY MEDICINE

## 2022-02-15 PROCEDURE — 99214 OFFICE O/P EST MOD 30 MIN: CPT | Performed by: FAMILY MEDICINE

## 2022-02-15 NOTE — PATIENT INSTRUCTIONS
Good exam       Consider physical therapy if neck / arm discomfort persisting       Recheck of labs today     Schedule appointment for physical in August - 08/13/21

## 2022-02-17 ENCOUNTER — TELEPHONE (OUTPATIENT)
Dept: FAMILY MEDICINE CLINIC | Facility: CLINIC | Age: 77
End: 2022-02-17

## 2022-02-17 NOTE — TELEPHONE ENCOUNTER
Spoke to pt provided lab results below.  Confirmed pt is taking vit d 2 tabs po bid, updated med list.

## 2022-02-17 NOTE — TELEPHONE ENCOUNTER
----- Message from CHARLIE Chavez sent at 2/16/2022  3:40 PM CST -----  Patient's metabolic panel stable compared to prior. Vitamin D improved from years prior but ideally would like above 51. Please confirm Vitamin D dosing.

## 2022-03-21 RX ORDER — SIMVASTATIN 10 MG
10 TABLET ORAL
Qty: 90 TABLET | Refills: 1 | Status: SHIPPED | OUTPATIENT
Start: 2022-03-21

## 2022-03-21 NOTE — TELEPHONE ENCOUNTER
Future appt: Your appointments     Date & Time Appointment Department [facility])    Aug 15, 2022  1:30 PM CDT Medicare Annual Well Visit with Tomer Mclean MD 25 [facility] [place] ([facility])            [address] [place] [address] [place]  [facility] [phone]  [phone]        Last Appointment with provider:   2/15/2022 for follow up, vitamin d deficiency. Last appointment at [facility]:  2/15/2022  Cholesterol, Total (mg/dL)   Date Value   08/09/2021 168     HDL Cholesterol (mg/dL)   Date Value   08/09/2021 64 (H)     LDL Cholesterol (mg/dL)   Date Value   08/09/2021 84     Triglycerides (mg/dL)   Date Value   08/09/2021 114     No results found for: EAG, A1C  Lab Results   Component Value Date    TSH 3.680 08/09/2021       No follow-ups on file.

## 2022-08-15 ENCOUNTER — LAB ENCOUNTER (OUTPATIENT)
Dept: LAB | Age: 77
End: 2022-08-15
Attending: FAMILY MEDICINE
Payer: MEDICARE

## 2022-08-15 ENCOUNTER — OFFICE VISIT (OUTPATIENT)
Dept: FAMILY MEDICINE CLINIC | Facility: CLINIC | Age: 77
End: 2022-08-15
Payer: MEDICARE

## 2022-08-15 DIAGNOSIS — Z00.00 ENCOUNTER FOR ANNUAL HEALTH EXAMINATION: Primary | ICD-10-CM

## 2022-08-15 DIAGNOSIS — E78.00 PURE HYPERCHOLESTEROLEMIA: ICD-10-CM

## 2022-08-15 DIAGNOSIS — I10 BENIGN ESSENTIAL HYPERTENSION: ICD-10-CM

## 2022-08-15 LAB
ALBUMIN SERPL-MCNC: 3.8 G/DL (ref 3.4–5)
ALBUMIN/GLOB SERPL: 0.9 {RATIO} (ref 1–2)
ALP LIVER SERPL-CCNC: 78 U/L
ALT SERPL-CCNC: 28 U/L
ANION GAP SERPL CALC-SCNC: 3 MMOL/L (ref 0–18)
AST SERPL-CCNC: 22 U/L (ref 15–37)
BASOPHILS # BLD AUTO: 0.08 X10(3) UL (ref 0–0.2)
BASOPHILS NFR BLD AUTO: 1.1 %
BILIRUB SERPL-MCNC: 0.5 MG/DL (ref 0.1–2)
BILIRUB UR QL STRIP.AUTO: NEGATIVE
BUN BLD-MCNC: 21 MG/DL (ref 7–18)
CALCIUM BLD-MCNC: 9.8 MG/DL (ref 8.5–10.1)
CHLORIDE SERPL-SCNC: 107 MMOL/L (ref 98–112)
CHOLEST SERPL-MCNC: 179 MG/DL (ref ?–200)
CLARITY UR REFRACT.AUTO: CLEAR
CO2 SERPL-SCNC: 28 MMOL/L (ref 21–32)
COLOR UR AUTO: YELLOW
CREAT BLD-MCNC: 1.03 MG/DL
EOSINOPHIL # BLD AUTO: 0.06 X10(3) UL (ref 0–0.7)
EOSINOPHIL NFR BLD AUTO: 0.8 %
ERYTHROCYTE [DISTWIDTH] IN BLOOD BY AUTOMATED COUNT: 12.6 %
FASTING PATIENT LIPID ANSWER: NO
FASTING STATUS PATIENT QL REPORTED: NO
GFR SERPLBLD BASED ON 1.73 SQ M-ARVRAT: 56 ML/MIN/1.73M2 (ref 60–?)
GLOBULIN PLAS-MCNC: 4.3 G/DL (ref 2.8–4.4)
GLUCOSE BLD-MCNC: 98 MG/DL (ref 70–99)
GLUCOSE UR STRIP.AUTO-MCNC: NEGATIVE MG/DL
HCT VFR BLD AUTO: 39.9 %
HDLC SERPL-MCNC: 70 MG/DL (ref 40–59)
HGB BLD-MCNC: 13 G/DL
IMM GRANULOCYTES # BLD AUTO: 0.03 X10(3) UL (ref 0–1)
IMM GRANULOCYTES NFR BLD: 0.4 %
KETONES UR STRIP.AUTO-MCNC: NEGATIVE MG/DL
LDLC SERPL CALC-MCNC: 87 MG/DL (ref ?–100)
LEUKOCYTE ESTERASE UR QL STRIP.AUTO: NEGATIVE
LYMPHOCYTES # BLD AUTO: 3.02 X10(3) UL (ref 1–4)
LYMPHOCYTES NFR BLD AUTO: 40.1 %
MCH RBC QN AUTO: 33.2 PG (ref 26–34)
MCHC RBC AUTO-ENTMCNC: 32.6 G/DL (ref 31–37)
MCV RBC AUTO: 101.8 FL
MONOCYTES # BLD AUTO: 0.52 X10(3) UL (ref 0.1–1)
MONOCYTES NFR BLD AUTO: 6.9 %
NEUTROPHILS # BLD AUTO: 3.83 X10 (3) UL (ref 1.5–7.7)
NEUTROPHILS # BLD AUTO: 3.83 X10(3) UL (ref 1.5–7.7)
NEUTROPHILS NFR BLD AUTO: 50.7 %
NITRITE UR QL STRIP.AUTO: NEGATIVE
NONHDLC SERPL-MCNC: 109 MG/DL (ref ?–130)
OSMOLALITY SERPL CALC.SUM OF ELEC: 289 MOSM/KG (ref 275–295)
PH UR STRIP.AUTO: 5 [PH] (ref 5–8)
PLATELET # BLD AUTO: 251 10(3)UL (ref 150–450)
POTASSIUM SERPL-SCNC: 4.1 MMOL/L (ref 3.5–5.1)
PROT SERPL-MCNC: 8.1 G/DL (ref 6.4–8.2)
PROT UR STRIP.AUTO-MCNC: NEGATIVE MG/DL
RBC # BLD AUTO: 3.92 X10(6)UL
SODIUM SERPL-SCNC: 138 MMOL/L (ref 136–145)
SP GR UR STRIP.AUTO: 1.01 (ref 1–1.03)
TRIGL SERPL-MCNC: 124 MG/DL (ref 30–149)
TSI SER-ACNC: 5.33 MIU/ML (ref 0.36–3.74)
UROBILINOGEN UR STRIP.AUTO-MCNC: <2 MG/DL
VLDLC SERPL CALC-MCNC: 20 MG/DL (ref 0–30)
WBC # BLD AUTO: 7.5 X10(3) UL (ref 4–11)

## 2022-08-15 PROCEDURE — 80061 LIPID PANEL: CPT

## 2022-08-15 PROCEDURE — 36415 COLL VENOUS BLD VENIPUNCTURE: CPT

## 2022-08-15 PROCEDURE — 85025 COMPLETE CBC W/AUTO DIFF WBC: CPT

## 2022-08-15 PROCEDURE — 80053 COMPREHEN METABOLIC PANEL: CPT

## 2022-08-15 PROCEDURE — 84443 ASSAY THYROID STIM HORMONE: CPT

## 2022-08-15 PROCEDURE — 81001 URINALYSIS AUTO W/SCOPE: CPT

## 2022-08-22 ENCOUNTER — TELEPHONE (OUTPATIENT)
Dept: FAMILY MEDICINE CLINIC | Facility: CLINIC | Age: 77
End: 2022-08-22

## 2022-08-22 VITALS
HEIGHT: 60.5 IN | DIASTOLIC BLOOD PRESSURE: 88 MMHG | TEMPERATURE: 97 F | RESPIRATION RATE: 16 BRPM | SYSTOLIC BLOOD PRESSURE: 136 MMHG | OXYGEN SATURATION: 99 % | WEIGHT: 133.19 LBS | BODY MASS INDEX: 25.47 KG/M2 | HEART RATE: 63 BPM

## 2022-08-22 PROBLEM — M54.12 CERVICAL RADICULOPATHY: Status: RESOLVED | Noted: 2019-06-28 | Resolved: 2022-08-22

## 2022-08-22 PROBLEM — E55.9 VITAMIN D DEFICIENCY: Status: RESOLVED | Noted: 2019-08-13 | Resolved: 2022-08-22

## 2022-08-22 NOTE — TELEPHONE ENCOUNTER
----- Message from Joel Henry MD sent at 8/22/2022 11:33 AM CDT -----  Labs reviewed ( recent Health check)     Urinalysis -normal     Thyroid testing - TSH elevated - no prior history for abnormality   Recommend recheck in 3 - 6 months (recommend no supplements for 7 days prior - including multivitamin or anything containing Biotin) (order placed)    Cholesterol profile - total 179 / HDL 70 - very good. Chem profile - Blood sugar - 98 - good. Kidney function and liver testing - normal     CBC - normal     Recheck of other labs in 1 year.

## 2022-08-22 NOTE — TELEPHONE ENCOUNTER
received her lab reports from 8/15  has one in question, TSH. Wants to know what to do about it? No future appointments.

## 2022-10-24 DIAGNOSIS — E78.00 PURE HYPERCHOLESTEROLEMIA: ICD-10-CM

## 2022-10-24 RX ORDER — SIMVASTATIN 10 MG
10 TABLET ORAL
Qty: 90 TABLET | Refills: 3 | Status: SHIPPED | OUTPATIENT
Start: 2022-10-24

## 2022-10-24 RX ORDER — CELECOXIB 100 MG/1
100 CAPSULE ORAL DAILY
Qty: 90 CAPSULE | Refills: 3 | Status: SHIPPED | OUTPATIENT
Start: 2022-10-24

## 2022-10-24 NOTE — TELEPHONE ENCOUNTER
Future appt:    Last Appointment with provider:   8/15/2022; Recheck in 1 year. Last appointment at [facility]:  8/15/2022  Cholesterol, Total (mg/dL)   Date Value   08/15/2022 179     HDL Cholesterol (mg/dL)   Date Value   08/15/2022 70 (H)     LDL Cholesterol (mg/dL)   Date Value   08/15/2022 87     Triglycerides (mg/dL)   Date Value   08/15/2022 124     No results found for: EAG, A1C  Lab Results   Component Value Date    TSH 5.330 (H) 08/15/2022     Last RF:  3/21/2022    No follow-ups on file.

## 2022-10-24 NOTE — TELEPHONE ENCOUNTER
Future appt:    Last Appointment with provider:  8/15/2022; Recheck in 1 year. Last appointment at [facility]:  8/15/2022  Cholesterol, Total (mg/dL)   Date Value   08/15/2022 179     HDL Cholesterol (mg/dL)   Date Value   08/15/2022 70 (H)     LDL Cholesterol (mg/dL)   Date Value   08/15/2022 87     Triglycerides (mg/dL)   Date Value   08/15/2022 124     No results found for: EAG, A1C  Lab Results   Component Value Date    TSH 5.330 (H) 08/15/2022     Last RF:  9/20/2021    No follow-ups on file.

## 2022-11-17 ENCOUNTER — TELEPHONE (OUTPATIENT)
Dept: FAMILY MEDICINE CLINIC | Facility: CLINIC | Age: 77
End: 2022-11-17

## 2022-11-17 NOTE — TELEPHONE ENCOUNTER
Should she only take her prescription drugs ten to fifteen days prior to blood test?  That's what she was told by   by   Future Appointments   Date Time Provider [facility]il [facility]   12/2/2022 10:15 AM REF [place] REF [facility]

## 2022-11-21 NOTE — TELEPHONE ENCOUNTER
Patient should take her regular medications     Patient should not take supplements with biotin 10 days prior to lab

## 2022-12-02 ENCOUNTER — LABORATORY ENCOUNTER (OUTPATIENT)
Dept: LAB | Age: 77
End: 2022-12-02
Attending: FAMILY MEDICINE
Payer: MEDICARE

## 2022-12-02 ENCOUNTER — TELEPHONE (OUTPATIENT)
Dept: FAMILY MEDICINE CLINIC | Facility: CLINIC | Age: 77
End: 2022-12-02

## 2022-12-02 DIAGNOSIS — E03.9 ACQUIRED HYPOTHYROIDISM: ICD-10-CM

## 2022-12-02 LAB
T4 FREE SERPL-MCNC: 0.8 NG/DL (ref 0.8–1.7)
TSI SER-ACNC: 3.52 MIU/ML (ref 0.36–3.74)

## 2022-12-02 PROCEDURE — 36415 COLL VENOUS BLD VENIPUNCTURE: CPT

## 2022-12-02 PROCEDURE — 84439 ASSAY OF FREE THYROXINE: CPT

## 2022-12-02 PROCEDURE — 84443 ASSAY THYROID STIM HORMONE: CPT

## 2022-12-02 NOTE — TELEPHONE ENCOUNTER
Wants to know if she is to hold off taking meds until her lab results are back - had labs done today - is asking for message to be left on phone- will be in and out of home

## 2022-12-02 NOTE — TELEPHONE ENCOUNTER
Pt was holding supplements for 15 days wanting to know if she should start taking them again. Pt stated TR advised the vitamin D may be causing her sx, she may be taking too much of it. Advised pt to continue to hold off until results are in. Also advised pt she will see her results on mychart and once TR reviews them we will let her know. Pt is agreeable.

## 2022-12-06 ENCOUNTER — PATIENT MESSAGE (OUTPATIENT)
Dept: FAMILY MEDICINE CLINIC | Facility: CLINIC | Age: 77
End: 2022-12-06

## 2022-12-07 ENCOUNTER — TELEPHONE (OUTPATIENT)
Dept: FAMILY MEDICINE CLINIC | Facility: CLINIC | Age: 77
End: 2022-12-07

## 2022-12-07 NOTE — TELEPHONE ENCOUNTER
From: Meme Lopez  To: Beata Sofia MD  Sent: 12/6/2022 10:53 PM CST  Subject: Thyroid test results     Thyroid test was taken last Friday. I was off all over counter medicine for 15 days. I have not started to retake medicines. Should I resume these medicines, any or all?

## 2022-12-07 NOTE — TELEPHONE ENCOUNTER
Pt sent [employer] message asking when she has been off all otc medications for 15 days prior to lab testing. Wanting to know if ok to resume all? Please review and advise. Also when should pt follow up?

## 2023-01-26 DIAGNOSIS — I10 BENIGN ESSENTIAL HYPERTENSION: ICD-10-CM

## 2023-01-26 NOTE — TELEPHONE ENCOUNTER
Last Refill: 2/7/2022 #90 with 3 refills  Last Px: 8/15/2022  F/U Instructions: Recheck in 1 year    Future appt:    Last Appointment with provider:   8/15/2022  Last appointment at [facility]:  8/15/2022  Cholesterol, Total (mg/dL)   Date Value   08/15/2022 179     HDL Cholesterol (mg/dL)   Date Value   08/15/2022 70 (H)     LDL Cholesterol (mg/dL)   Date Value   08/15/2022 87     Triglycerides (mg/dL)   Date Value   08/15/2022 124     No results found for: EAG, A1C  Lab Results   Component Value Date    T4F 0.8 12/02/2022    TSH 3.520 12/02/2022       No follow-ups on file.

## 2023-04-07 ENCOUNTER — TELEPHONE (OUTPATIENT)
Dept: FAMILY MEDICINE CLINIC | Facility: CLINIC | Age: 78
End: 2023-04-07

## 2023-04-07 NOTE — TELEPHONE ENCOUNTER
Pt would like to know if she should come in for an appt, states that she doesn't remember if she needed a 6 month f/u appt after her last visit in August of last year.

## 2023-04-07 NOTE — TELEPHONE ENCOUNTER
Per office visit on 8/15/2022, pt was instructed to follow up in 1 year. No further questions or concerns.

## 2023-08-23 ENCOUNTER — TELEPHONE (OUTPATIENT)
Dept: FAMILY MEDICINE CLINIC | Facility: CLINIC | Age: 78
End: 2023-08-23

## 2023-08-23 NOTE — TELEPHONE ENCOUNTER
Pt wanted to know when her last bone density test was done and how often it should be repeated. Advised pt last scan was 8/7/2020. [facility] covers imaging every 2 years.

## (undated) DIAGNOSIS — I10 BENIGN ESSENTIAL HYPERTENSION: ICD-10-CM

## (undated) DIAGNOSIS — E78.00 PURE HYPERCHOLESTEROLEMIA: ICD-10-CM

## (undated) DEVICE — Device

## (undated) DEVICE — BANDAID COVERLET 1X3

## (undated) DEVICE — 3M™ TEGADERM™ TRANSPARENT FILM DRESSING, 1626W, 4 IN X 4-3/4 IN (10 CM X 12 CM), 50 EACH/CARTON, 4 CARTON/CASE: Brand: 3M™ TEGADERM™

## (undated) DEVICE — GLOVE SURG SENSICARE SZ 6-1/2

## (undated) DEVICE — GLOVE SURG SENSICARE SZ 7-1/2

## (undated) DEVICE — AVANOS* TUOHY EPIDURAL NEEDLE: Brand: AVANOS

## (undated) DEVICE — CAP,BOUFFANT,SPUNBOND,BLUE,24": Brand: MEDLINE INDUSTRIES, INC.

## (undated) DEVICE — PAIN TRAY: Brand: MEDLINE INDUSTRIES, INC.

## (undated) NOTE — MR AVS SNAPSHOT
[address]  [place] [address] [id number] [phone]               Thank you for choosing us for your health care visit with Ese Machuca MD.  We are glad to serve you and happy to provide you with this summary of Take 1 tablet by mouth daily. Commonly known as:  ZOCOR                   Hon[employer]     Sign up for [employer], your secure online medical record.   [employer] will allow you to access patient instructions from your recent visit,  view other health information, a

## (undated) NOTE — MR AVS SNAPSHOT
[address]  [place] [id number]  [phone]               Thank you for choosing us for your health care visit with Fabiola Nam MD.  We are glad to serve you and happy to provide you with this summary of Functional Status questions complete?:      Assoc Dx:  Polyp of colon, unspecified part of colon, unspecified type [K63.5]          Reason for Today's Visit     Physical           Medical Issues Discussed Today     Polyp of colon, unspecified part of colo information, go to [email or web address] and click on the Sign Up Now link in the [employer] box. Enter your [employer] Activation Code exactly as it appears below along with your Zip Code and Date of Birth to complete the sign-up process.  If you do You don’t need to join a gym. Home exercises work great.  Put more priority on exercise in your life                    Visit [facility] online at  [email or web address]